# Patient Record
Sex: FEMALE | Race: WHITE | Employment: OTHER | URBAN - METROPOLITAN AREA
[De-identification: names, ages, dates, MRNs, and addresses within clinical notes are randomized per-mention and may not be internally consistent; named-entity substitution may affect disease eponyms.]

---

## 2017-10-18 ENCOUNTER — OFFICE VISIT (OUTPATIENT)
Dept: ORTHOPEDIC SURGERY | Age: 72
End: 2017-10-18

## 2017-10-18 VITALS
BODY MASS INDEX: 27.59 KG/M2 | HEIGHT: 62 IN | SYSTOLIC BLOOD PRESSURE: 124 MMHG | DIASTOLIC BLOOD PRESSURE: 70 MMHG | WEIGHT: 149.91 LBS | HEART RATE: 72 BPM

## 2017-10-18 DIAGNOSIS — M79.671 FOOT PAIN, RIGHT: Primary | ICD-10-CM

## 2017-10-18 DIAGNOSIS — M77.8 CAPSULITIS OF FOOT, RIGHT: ICD-10-CM

## 2017-10-18 PROCEDURE — 73630 X-RAY EXAM OF FOOT: CPT | Performed by: PODIATRIST

## 2017-10-18 PROCEDURE — L3260 AMBULATORY SURGICAL BOOT EAC: HCPCS | Performed by: PODIATRIST

## 2017-10-18 PROCEDURE — 99203 OFFICE O/P NEW LOW 30 MIN: CPT | Performed by: PODIATRIST

## 2017-10-18 NOTE — PROGRESS NOTES
HISTORY OF PRESENT ILLNESS: This is an initial visit for a 52-year-old female with a chief complaint of right forefoot pain. She's been having pain for approximately 3 months. 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  No history of  trauma is related. The pain is primarily on the bottom of the forefoot. The pain is worsened with weightbearing and relieved with rest.    FAMILY HISTORY:  Documented in chart. SOCIAL HISTORY:  Documented in chart. REVIEW OF SYSTEMS: Unremarkable for psychiatric, dermatologic, neurologic,  cardiovascular, hematologic, gastrointestinal, genitourinary, and pulmonary  problems. PHYSICAL EXAM: The area of greatest palpable tenderness is at the plantar aspect  of the right 2nd MTP. She has mild pain underneath the 3rd and 4th MTPs as well. She has semirigid hammertoe deformities 2-5 of the right foot and a moderate hallux valgus deformity. There is mild edema in the area without erythema or ecchymosis. There is mild pain with range of motion to the joint, especially at end range of dorsiflexion. There is no deviation of the digit in the transverse plane with range of motion, nor is there a static deformity. Palpable pedal pulses are present. The sensation is grossly intact. RADIOGRAPHS: Three weightbearing views of the right foot were obtained. There are no acute fractures or periosteal reactions noted. The 2nd and 3rd MTP is in a congruent position but slightly adducted. No joint distension is noted. She has a moderate increase in the 1st intermetatarsal angle. ASSESSMENT: 2nd, 3rd, and 4th MTP capsulitis and metatarsalgia, right foot      PLAN: The patient was educated on the pathology and its treatment options. A temporary arch support and a post-op shoe was applied to the right foot. Weightbearing will be as tolerated.  All activities are to be performed with this combination on the foot.    Overall activity is to be decreased. The patient is to rest, ice, and elevate to relieve pain and/or swelling. She has a ballroom dancing competition on November 10. I recommended she consider postponing that and not practice and dance at this time. The patient is to return in 3 weeks for reevaluation. Procedures    Darco Post-op Shoe Brace     Patient was prescribed a Darco Post-Op Shoe. The right foot will require stabilization / immobilization from this semi-rigid / rigid orthosis to improve their function. The orthosis will assist in protecting the affected area, provide functional support and facilitate healing. The patient was educated and fit by a healthcare professional with expert knowledge and specialization in brace application while under the direct supervision of the treating physician. Verbal and written instructions for the use of and application of this item were provided. They were instructed to contact the office immediately should the brace result in increased pain, decreased sensation, increased swelling or worsening of the condition.

## 2017-11-06 ENCOUNTER — OFFICE VISIT (OUTPATIENT)
Dept: ORTHOPEDIC SURGERY | Age: 72
End: 2017-11-06

## 2017-11-06 VITALS
HEART RATE: 76 BPM | BODY MASS INDEX: 27.59 KG/M2 | DIASTOLIC BLOOD PRESSURE: 75 MMHG | WEIGHT: 149.91 LBS | SYSTOLIC BLOOD PRESSURE: 135 MMHG | HEIGHT: 62 IN

## 2017-11-06 DIAGNOSIS — M77.8 CAPSULITIS OF FOOT, RIGHT: Primary | ICD-10-CM

## 2017-11-06 PROCEDURE — 99213 OFFICE O/P EST LOW 20 MIN: CPT | Performed by: PODIATRIST

## 2017-11-06 NOTE — PROGRESS NOTES
HISTORY OF PRESENT ILLNESS: This is a return visit for right forefoot pain. No new trauma is related. The pain is greater than 50% better. PHYSICAL EXAM: She has very mild palpable tenderness underneath the right 2nd MTP. There is minimal edema in the area without  erythema or ecchymosis. There is no deviation of the digit in the  transverse plane with range of motion, nor is there a static deformity. Palpable  pedal pulses are present. The sensation is grossly intact. ASSESSMENT: 2nd, 3rd, and 4th MTP capsulitis, metatarsalgia,and tenosynovitis with right foot  pain. PLAN: I advised her to use the postop shoe consistently for the next 7-10 days. If her symptoms have resolved and she can transition into an athletic shoe. From there she can try her dance shoes as tolerated. I'll see her back as needed.

## 2018-01-11 ENCOUNTER — OFFICE VISIT (OUTPATIENT)
Dept: ORTHOPEDIC SURGERY | Age: 73
End: 2018-01-11

## 2018-01-11 VITALS
WEIGHT: 149.91 LBS | BODY MASS INDEX: 27.59 KG/M2 | HEIGHT: 62 IN | SYSTOLIC BLOOD PRESSURE: 133 MMHG | HEART RATE: 64 BPM | DIASTOLIC BLOOD PRESSURE: 71 MMHG

## 2018-01-11 DIAGNOSIS — M77.8 CAPSULITIS OF FOOT, RIGHT: Primary | ICD-10-CM

## 2018-01-11 PROCEDURE — 20600 DRAIN/INJ JOINT/BURSA W/O US: CPT | Performed by: PODIATRIST

## 2018-01-11 PROCEDURE — 99213 OFFICE O/P EST LOW 20 MIN: CPT | Performed by: PODIATRIST

## 2018-01-11 RX ORDER — ALPRAZOLAM 0.5 MG/1
TABLET ORAL
COMMUNITY
Start: 2017-12-06 | End: 2019-10-25

## 2019-10-25 ENCOUNTER — OFFICE VISIT (OUTPATIENT)
Dept: ORTHOPEDIC SURGERY | Age: 74
End: 2019-10-25
Payer: MEDICARE

## 2019-10-25 VITALS
HEART RATE: 71 BPM | WEIGHT: 147 LBS | DIASTOLIC BLOOD PRESSURE: 89 MMHG | HEIGHT: 61 IN | BODY MASS INDEX: 27.75 KG/M2 | SYSTOLIC BLOOD PRESSURE: 132 MMHG

## 2019-10-25 DIAGNOSIS — M70.61 GREATER TROCHANTERIC BURSITIS OF BOTH HIPS: ICD-10-CM

## 2019-10-25 DIAGNOSIS — M70.62 GREATER TROCHANTERIC BURSITIS OF BOTH HIPS: ICD-10-CM

## 2019-10-25 DIAGNOSIS — M25.551 PAIN OF BOTH HIP JOINTS: Primary | ICD-10-CM

## 2019-10-25 DIAGNOSIS — M25.552 PAIN OF BOTH HIP JOINTS: Primary | ICD-10-CM

## 2019-10-25 PROCEDURE — 99213 OFFICE O/P EST LOW 20 MIN: CPT | Performed by: ORTHOPAEDIC SURGERY

## 2019-10-25 PROCEDURE — 20610 DRAIN/INJ JOINT/BURSA W/O US: CPT | Performed by: ORTHOPAEDIC SURGERY

## 2019-10-25 RX ORDER — PANTOPRAZOLE SODIUM 40 MG/1
TABLET, DELAYED RELEASE ORAL
Refills: 2 | COMMUNITY
Start: 2019-09-04

## 2019-12-03 ENCOUNTER — TELEPHONE (OUTPATIENT)
Dept: ORTHOPEDIC SURGERY | Age: 74
End: 2019-12-03

## 2019-12-10 ENCOUNTER — HOSPITAL ENCOUNTER (OUTPATIENT)
Dept: PHYSICAL THERAPY | Age: 74
Setting detail: THERAPIES SERIES
Discharge: HOME | End: 2019-12-10
Payer: MEDICARE

## 2019-12-10 PROCEDURE — 97140 MANUAL THERAPY 1/> REGIONS: CPT

## 2019-12-10 PROCEDURE — 97110 THERAPEUTIC EXERCISES: CPT

## 2019-12-10 PROCEDURE — 97161 PT EVAL LOW COMPLEX 20 MIN: CPT

## 2019-12-17 ENCOUNTER — APPOINTMENT (OUTPATIENT)
Dept: PHYSICAL THERAPY | Age: 74
End: 2019-12-17
Payer: MEDICARE

## 2019-12-19 ENCOUNTER — APPOINTMENT (OUTPATIENT)
Dept: PHYSICAL THERAPY | Age: 74
End: 2019-12-19
Payer: MEDICARE

## 2019-12-24 ENCOUNTER — APPOINTMENT (OUTPATIENT)
Dept: PHYSICAL THERAPY | Age: 74
End: 2019-12-24
Payer: MEDICARE

## 2019-12-31 ENCOUNTER — OFFICE VISIT (OUTPATIENT)
Dept: ORTHOPEDIC SURGERY | Age: 74
End: 2019-12-31
Payer: MEDICARE

## 2019-12-31 VITALS
HEIGHT: 61 IN | SYSTOLIC BLOOD PRESSURE: 138 MMHG | BODY MASS INDEX: 26.43 KG/M2 | WEIGHT: 140 LBS | DIASTOLIC BLOOD PRESSURE: 79 MMHG | HEART RATE: 70 BPM

## 2019-12-31 DIAGNOSIS — M70.62 GREATER TROCHANTERIC BURSITIS OF BOTH HIPS: Primary | ICD-10-CM

## 2019-12-31 DIAGNOSIS — M70.61 GREATER TROCHANTERIC BURSITIS OF BOTH HIPS: Primary | ICD-10-CM

## 2019-12-31 DIAGNOSIS — M25.552 PAIN OF BOTH HIP JOINTS: ICD-10-CM

## 2019-12-31 DIAGNOSIS — M25.551 PAIN OF BOTH HIP JOINTS: ICD-10-CM

## 2019-12-31 PROCEDURE — 99213 OFFICE O/P EST LOW 20 MIN: CPT | Performed by: ORTHOPAEDIC SURGERY

## 2019-12-31 PROCEDURE — 20610 DRAIN/INJ JOINT/BURSA W/O US: CPT | Performed by: ORTHOPAEDIC SURGERY

## 2020-01-17 ENCOUNTER — TELEPHONE (OUTPATIENT)
Dept: ORTHOPEDIC SURGERY | Age: 75
End: 2020-01-17

## 2020-01-21 ENCOUNTER — OFFICE VISIT (OUTPATIENT)
Dept: ORTHOPEDIC SURGERY | Age: 75
End: 2020-01-21
Payer: MEDICARE

## 2020-01-21 ENCOUNTER — TELEPHONE (OUTPATIENT)
Dept: ORTHOPEDIC SURGERY | Age: 75
End: 2020-01-21

## 2020-01-21 VITALS
WEIGHT: 140 LBS | BODY MASS INDEX: 26.43 KG/M2 | HEART RATE: 72 BPM | HEIGHT: 61 IN | SYSTOLIC BLOOD PRESSURE: 111 MMHG | DIASTOLIC BLOOD PRESSURE: 66 MMHG

## 2020-01-21 PROCEDURE — 99213 OFFICE O/P EST LOW 20 MIN: CPT | Performed by: ORTHOPAEDIC SURGERY

## 2020-01-21 NOTE — PROGRESS NOTES
sexual activity: None    Other Topics      Concerns:        None    Social History Narrative      None      Current Outpatient Medications:  pantoprazole (PROTONIX) 40 MG tablet, TAKE 1 TABLET BY MOUTH EVERY DAY, Disp: , Rfl: 2  oxybutynin (DITROPAN-XL) 5 MG CR tablet, , Disp: , Rfl:   ALPRAZolam (XANAX) 0.25 MG tablet, Take 0.25 mg by mouth 5 times daily. , Disp: , Rfl:   FLUoxetine (PROZAC) 20 MG capsule, Take 20 mg by mouth 2 times daily. , Disp: , Rfl:   Probiotic Product (PROBIOTIC DAILY PO), Take 1 tablet by mouth daily. , Disp: , Rfl:   FIBER PO, Take 1 tablet by mouth 5 times daily. Pt has diverticulitis, Disp: , Rfl:   Multiple Vitamins-Minerals (MULTIVITAMIN PO), Take 1 tablet by mouth daily. , Disp: , Rfl:   gabapentin (NEURONTIN) 100 MG capsule, Take 900 mg by mouth daily. 100 mg 4xs daily and 500mg at night, Disp: , Rfl:   meloxicam (MOBIC) 15 MG tablet, Take 15 mg by mouth daily. , Disp: , Rfl:   Glucosamine-Chondroitin (GLUCOSAMINE CHONDR COMPLEX PO), Take 2 tablets by mouth daily. , Disp: , Rfl:   HYDROcodone-acetaminophen (NORCO) 5-325 MG per tablet, Take 1-2 tablets by mouth every 4 hours as needed for Pain. (Patient not taking: Reported on 1/21/2020), Disp: 40 tablet, Rfl: 0    No current facility-administered medications for this visit. -- Meperidine    -- Oxycodone-Acetaminophen    -- Propoxyphene    -- Codeine -- Nausea And Vomiting   -- Percocet [Oxycodone-Acetaminophen] -- Rash    VITAL SIGNS:  /66   Pulse 72   Ht 5' 1\" (1.549 m)   Wt 140 lb (63.5 kg)   BMI 26.45 kg/m²   Examination left hip today she has excellent range of motion. She flexes almost knee-to-chest.  At 90 degrees she has 30 degrees internal rotation and nearly 70 degrees external rotation and this does not cause pain. She is very tender over the greater trochanter but she is also tender at the proximal tip of the trochanter so she may have some gluteus medius tendinosis. It is a flexible IT band.   She has pretty good abduction strength. Has no sciatic notch tenderness has a negative straight leg raise. Impression chronic recurrent left hip trochanteric bursitis. Possible gluteus medius tendon tear. Plan: Since recovery time for repair of gluteus medius tendon is much longer than IT band tenotomy and bursectomy would like to get an MRI so the patient would be aware what she is getting into. We will order an MRI and see her back once this is completed.

## 2020-01-24 ENCOUNTER — HOSPITAL ENCOUNTER (OUTPATIENT)
Dept: MRI IMAGING | Age: 75
Discharge: HOME | End: 2020-01-24
Payer: MEDICARE

## 2020-01-24 PROCEDURE — 73721 MRI JNT OF LWR EXTRE W/O DYE: CPT

## 2020-01-29 ENCOUNTER — OFFICE VISIT (OUTPATIENT)
Dept: ORTHOPEDIC SURGERY | Age: 75
End: 2020-01-29
Payer: MEDICARE

## 2020-01-29 VITALS
SYSTOLIC BLOOD PRESSURE: 139 MMHG | DIASTOLIC BLOOD PRESSURE: 82 MMHG | WEIGHT: 140 LBS | HEIGHT: 61 IN | HEART RATE: 67 BPM | BODY MASS INDEX: 26.43 KG/M2

## 2020-01-29 PROCEDURE — 99213 OFFICE O/P EST LOW 20 MIN: CPT | Performed by: ORTHOPAEDIC SURGERY

## 2020-01-29 PROCEDURE — 20610 DRAIN/INJ JOINT/BURSA W/O US: CPT | Performed by: ORTHOPAEDIC SURGERY

## 2020-01-29 NOTE — PROGRESS NOTES
Depo Medrol     NDC #: [id number]  Lot #:  [id number]  Exp Date: 05/2021    Ropivacaine    [facility]#:  [id number]  LOT#:  [id number]  Exp Date:  05/2023    Lt Hip/GTB

## 2020-02-11 ENCOUNTER — HOSPITAL ENCOUNTER (OUTPATIENT)
Dept: PHYSICAL THERAPY | Age: 75
Setting detail: THERAPIES SERIES
Discharge: HOME | End: 2020-02-11
Payer: MEDICARE

## 2020-02-11 PROCEDURE — 97110 THERAPEUTIC EXERCISES: CPT

## 2020-02-11 NOTE — PLAN OF CARE
[place] [address] [address]  Phone: [phone] Fax: [phone]        Physical Therapy Re-Certification Plan of Care/MD UPDATE      Dear  Dr. Marcos Glaser,    We had the pleasure of treating the following patient for physical therapy services at [address] [place] [address]. A summary of our findings can be found in the updated assessment below. This includes our plan of care. If you have any questions or concerns regarding these findings, please do not hesitate to contact me at the office phone number checked above.   Thank you for the referral.     Physician Signature:________________________________Date:__________________  By signing above (or electronic signature), therapists plan is approved by physician    Date Range Of Visits: 12/10/2019-2020  Total Visits to Date: 2  Overall Response to Treatment:   [x]Patient is responding well to treatment and improvement is noted with regards  to goals   []Patient should continue to improve in reasonable time if they continue HEP   []Patient has plateaued and is no longer responding to skilled PT intervention    []Patient is getting worse and would benefit from return to referring MD   []Patient unable to adhere to initial POC   []Other:       Physical Therapy Treatment Note/ Progress Report:     Date:  2020    Patient Name:  Bonnie Billjung    :  1945  MRN: [id number]  Restrictions/Precautions:    Medical/Treatment Diagnosis Information:  · Diagnosis: M70.61, M70.62 Greater trochanteric bursitis of both hips  · Treatment Diagnosis: bilateral hip pain; decreased hip ROM; decreased proximal hip strength  Insurance/Certification information:  PT Insurance Information: [employer]; [place]; $35 copay; [id number] not billable; auth after 20  Physician Information:  Referring Practitioner: Dr. Art Watts of care signed (Y/N):     Date of Patient follow up with coordination, kinesthetic sense, posture, motor skill, proprioception of core, proximal hip and LE for self care, mobility, lifting, and ambulation/stair navigation      Manual Treatments:  PROM / STM / Oscillations-Mobs:  G-I, II, III, IV (PA's, Inf., Post.)  [x] (65833) Provided manual therapy to mobilize LE, proximal hip and/or LS spine soft tissue/joints for the purpose of modulating pain, promoting relaxation,  increasing ROM, reducing/eliminating soft tissue swelling/inflammation/restriction, improving soft tissue extensibility and allowing for proper ROM for normal function with self care, mobility, lifting and ambulation. Modalities:     [] GAME READY (VASO)- for significant edema, swelling, pain control. Charges:  Timed Code Treatment Minutes: 40   Total Treatment Minutes: 40      [] EVAL (LOW) 22918 (typically 20 minutes face-to-face)  [] EVAL (MOD) 71521 (typically 30 minutes face-to-face)  [] EVAL (HIGH) 18983 (typically 45 minutes face-to-face)  [] RE-EVAL     [x] SM(03379) x  3   [] IONTO (40854)  [] NMR (01148) x     [] VASO (84019)  [] Manual (01286) x     [] Other:  [] TA (68780)x     [] Mech Traction (94571)  [] ES(attended) (27909)     [] ES (un) (95231):         GOALS:  Patient stated goal: \"drive without pain, improve balance\"  [] Progressing: [] Met: [x] Not Met: [] Adjusted    Therapist goals for Patient:   Short Term Goals: To be achieved in: 2 weeks  1. Independent in HEP and progression per patient tolerance, in order to prevent re-injury. [] Progressing: [] Met: [x] Not Met: [] Adjusted  2. Patient will have a decrease in pain to facilitate improvement in movement, function, and ADLs as indicated by Functional Deficits. [] Progressing: [] Met: [x] Not Met: [] Adjusted    Long Term Goals: To be achieved in: 8 weeks  1. Disability index score of 25% or less for the LEFS to assist with reaching prior level of function. [] Progressing: [] Met: [x] Not Met: [] Adjusted  2.  Patient will demonstrate increased [facility] [place] w/o increased sx to allow for proper joint functioning as indicated by patients Functional Deficits. [] Progressing: [] Met: [x] Not Met: [] Adjusted  3. Patient will demonstrate an increase in Strength to good proximal hip strength and control, within 5lb HHD in LE to allow for proper functional mobility as indicated by patients Functional Deficits. [] Progressing: [] Met: [x] Not Met: [] Adjusted  4. Patient will return to going up stairs functional activities without increased symptoms or restriction. [] Progressing: [] Met: [x] Not Met: [] Adjusted  5. Pt will be able to drive to [place] without increased symptoms or restriction. [x] Progressing: [] Met: [] Not Met: [] Adjusted    ASSESSMENT:  Pt tolerated treatment well. TTP over L glute med/min but otherwise no increase in sx w/ MMTs or exercises. Required cues to prevent hip flexor compensation w/ side lying clams and hip abduction. Pt to continue to benefit from skilled PT to improve functional and proximal hip strength and stability. Treatment/Activity Tolerance:  [x] Patient tolerated treatment well [] Patient limited by fatique  [] Patient limited by pain  [] Patient limited by other medical complications  [] Other:     Overall Progression Towards Functional goals/ Treatment Progress Update:  [] Patient is progressing as expected towards functional goals listed. [] Progression is slowed due to complexities/Impairments listed. [] Progression has been slowed due to co-morbidities.   [x] Plan just implemented, too soon to assess goals progression <30days   [] Goals require adjustment due to lack of progress  [] Patient is not progressing as expected and requires additional follow up with physician  [] Other    Prognosis for POC: [x] Good [] Fair  [] Poor    Patient requires continued skilled intervention: [x] Yes  [] No        PLAN: See eval  [] Continue per plan of care [] Alter current plan

## 2020-02-20 ENCOUNTER — HOSPITAL ENCOUNTER (OUTPATIENT)
Dept: PHYSICAL THERAPY | Age: 75
Setting detail: THERAPIES SERIES
Discharge: HOME | End: 2020-02-20
Payer: MEDICARE

## 2020-02-20 PROCEDURE — 97110 THERAPEUTIC EXERCISES: CPT

## 2020-02-20 NOTE — FLOWSHEET NOTE
[facility] [address] [facility]  Phone: [phone] Fax: [phone]      Physical Therapy Treatment Note/ Progress Report:     Date:  2020    Patient Name:  Clover Vargas    :  [birth year]  MRN: [id number]  Restrictions/Precautions:    Medical/Treatment Diagnosis Information:  · Diagnosis: M70.61, M70.62 Greater trochanteric bursitis of both hips  · Treatment Diagnosis: bilateral hip pain; decreased hip ROM; decreased proximal hip strength  Insurance/Certification information:  PT Insurance Information: [employer]; [place]; $35 copay; [id number] not billable; auth after 20  Physician Information:  Referring Practitioner: Dr. Starr Vee of care signed (Y/N):     Date of Patient follow up with Physician:      Progress Report: []  Yes  [x]  No     Date Range for reporting period:  Beginnin/10/2019  Ending:      Progress report due (10 Rx/or 30 days whichever is less):     Recertification due (POC duration/ or 90 days whichever is less): 3/24/2020     Visit # Insurance Allowable Auth Needed   2 [place] [x]Yes   []No     Latex Allergy:  [x]NO      []YES  Preferred Language for Healthcare:   [x]English       []other:  Functional Scale: LEFS: 38/80  Date assessed:12/10/2019    Pain level:  0/10 currenty    SUBJECTIVE:  Pt states she is feeling good today, no issues after last visit.     OBJECTIVE:   Observation:    Test measurements:     2020  Strength  LEFT RIGHT   HIP Flexors 4+ 4+   HIP Abductors 3+ 4   Knee EXT (quad) 4+ 4+   Knee Flex (HS) 4+ 4+   Ankle DF 5 5          RESTRICTIONS/PRECAUTIONS:     Exercises/Interventions:     Therapeutic Ex (90670)   Min: 40 Resistance Sets/sec Reps Notes/CUES   Retro Stepper/BIKE  5'     Alter G       Supine knees to chest  2 10    Sportco       3 way SLR 1# 2 10 Slr/abd; bilateral   Supine knee fallouts  2 10 Bilateral; red band   Clam ABD  2 10 bilateral   Hip Ext Gutierrez Other       BOSU fwd/side lunge       BOSU squat       Leg Press Iso/Con/Ecc 0- 50# 2 10    Cybex HS curl       TKE    NPV   Glute side walks red 2 10    RDL       Slide Lunge       Slide HS eccentrics       Step ups/ecc step down       Swissball wall rolls- in SLS- hip drive       Quad hip ext/wall-ball rolls       Supine piriformis stretch  30'' 3 bilateral   Glute bridge Red band 2 10    Manual Intervention (90631)  Min:15       Hip mobs/PROM       Tib/Fem Mobs       Patella Mobs       Ankle mobs              NMR re-education (38827)  Min:    CUES NEEDED   [place]une[place]/Biofeedback 10/10       BFR       G. Med activation       Hip Ext full ROM/ G. Activation       Bosu Bal and Prop- G Med       Single leg stance/Balance/Prop       Bosu Retro G. Med act       Prone Hip froggers- sliders/elevated              Therapeutic Activity (41407)  Min:       Ladders       Plyos       Dynamic Balance                                Therapeutic Exercise and NMR EXR  [x] (25618) Provided verbal/tactile cueing for activities related to strengthening, flexibility, endurance, ROM for improvements in LE, proximal hip, and core control with self care, mobility, lifting, ambulation. [x] (06012) Provided verbal/tactile cueing for activities related to improving balance, coordination, kinesthetic sense, posture, motor skill, proprioception  to assist with LE, proximal hip, and core control in self care, mobility, lifting, ambulation and eccentric single leg control.      NMR and Therapeutic Activities:    [x] (83165 or 12535) Provided verbal/tactile cueing for activities related to improving balance, coordination, kinesthetic sense, posture, motor skill, proprioception and motor activation to allow for proper function of core, proximal hip and LE with self care and ADLs and functional mobility.   [] (53771) Gait Re-education- Provided training and instruction to the patient for proper LE, core and proximal hip recruitment and positioning Adjusted  2. Patient will have a decrease in pain to facilitate improvement in movement, function, and ADLs as indicated by Functional Deficits. [] Progressing: [] Met: [x] Not Met: [] Adjusted    Long Term Goals: To be achieved in: 8 weeks  1. Disability index score of 25% or less for the LEFS to assist with reaching prior level of function. [] Progressing: [] Met: [x] Not Met: [] Adjusted  2. Patient will demonstrate increased AROM to IR [place] w/o increased sx to allow for proper joint functioning as indicated by patients Functional Deficits. [] Progressing: [] Met: [x] Not Met: [] Adjusted  3. Patient will demonstrate an increase in Strength to good proximal hip strength and control, within 5lb HHD in LE to allow for proper functional mobility as indicated by patients Functional Deficits. [] Progressing: [] Met: [x] Not Met: [] Adjusted  4. Patient will return to going up stairs functional activities without increased symptoms or restriction. [] Progressing: [] Met: [x] Not Met: [] Adjusted  5. Pt will be able to drive to [place] without increased symptoms or restriction. [x] Progressing: [] Met: [] Not Met: [] Adjusted    ASSESSMENT:  Pt tolerated treatment well. Required cues to prevent hip flexor compensation w/ side lying clams and hip abduction. Pt stated she feels good and would like to try to progress resistance and load next visit. Pt to continue to benefit from skilled PT to improve functional and proximal hip strength and stability. Treatment/Activity Tolerance:  [x] Patient tolerated treatment well [] Patient limited by fatique  [] Patient limited by pain  [] Patient limited by other medical complications  [] Other:     Overall Progression Towards Functional goals/ Treatment Progress Update:  [] Patient is progressing as expected towards functional goals listed. [] Progression is slowed due to complexities/Impairments listed.   [] Progression has been slowed due to

## 2020-02-27 ENCOUNTER — APPOINTMENT (OUTPATIENT)
Dept: PHYSICAL THERAPY | Age: 75
End: 2020-02-27
Payer: MEDICARE

## 2020-03-05 ENCOUNTER — HOSPITAL ENCOUNTER (OUTPATIENT)
Dept: PHYSICAL THERAPY | Age: 75
Setting detail: THERAPIES SERIES
Discharge: HOME | End: 2020-03-05
Payer: MEDICARE

## 2020-03-05 PROCEDURE — 97140 MANUAL THERAPY 1/> REGIONS: CPT

## 2020-03-05 PROCEDURE — 97110 THERAPEUTIC EXERCISES: CPT

## 2020-03-05 NOTE — FLOWSHEET NOTE
[place] [address] [place] [address]  Phone: [phone] Fax: [phone]      Physical Therapy Treatment Note/ Progress Report:     Date:  3/5/2020    Patient Name:  Cedric Blunt    :  [age]  MRN: [id number]  Restrictions/Precautions:    Medical/Treatment Diagnosis Information:  · Diagnosis: M70.61, M70.62 Greater trochanteric bursitis of both hips  · Treatment Diagnosis: bilateral hip pain; decreased hip ROM; decreased proximal hip strength  Insurance/Certification information:  PT Insurance Information: [employer]; [place]; [place]35 [place]; 45793 not billable; auth after 20  Physician Information:  Referring Practitioner: Dr. Sol Line of care signed (Y/N):     Date of Patient follow up with Physician:      Progress Report: []  Yes  [x]  No     Date Range for reporting period:  Beginnin/10/2019  Ending:      Progress report due (10 Rx/or 30 days whichever is less):     Recertification due (POC duration/ or 90 days whichever is less): 3/24/2020     Visit # Insurance Allowable Auth Needed   3 [place] [x]Yes   []No     Latex Allergy:  [x]NO      []YES  Preferred Language for Healthcare:   [x]English       []other:  Functional Scale: LEFS: 38/80  Date assessed:12/10/2019    Pain level:  0/10 currenty    SUBJECTIVE:  Pt states she is feeling good today. Does admit to increased hip pain last week after going to a family members dance competition and sitting on the floor so she could see buts states it is mostly better.     OBJECTIVE:   Observation:    Test measurements:     2020  Strength  LEFT RIGHT   HIP Flexors 4+ 4+   HIP Abductors 3+ 4   Knee EXT (quad) 4+ 4+   Knee Flex (HS) 4+ 4+   Ankle DF 5 5          RESTRICTIONS/PRECAUTIONS:     Exercises/Interventions:     Therapeutic Ex (33406)   Min: 40 Resistance Sets/sec Reps Notes/CUES   Retro Stepper/BIKE  5'     Alter G       Supine knees to chest  2 10 proper function of core, proximal hip and LE with self care and ADLs and functional mobility.   [] (55793) Gait Re-education- Provided training and instruction to the patient for proper LE, core and proximal hip recruitment and positioning and eccentric body weight control with ambulation re-education including up and down stairs     Home Exercise Program:    [x] (87892) Reviewed/Progressed HEP activities related to strengthening, flexibility, endurance, ROM of core, proximal hip and LE for functional self-care, mobility, lifting and ambulation/stair navigation   [] (49029)Reviewed/Progressed HEP activities related to improving balance, coordination, kinesthetic sense, posture, motor skill, proprioception of core, proximal hip and LE for self care, mobility, lifting, and ambulation/stair navigation      Manual Treatments:  PROM / STM / Oscillations-Mobs:  G-I, II, III, IV (PA's, Inf., Post.)  [x] (89487) Provided manual therapy to mobilize LE, proximal hip and/or LS spine soft tissue/joints for the purpose of modulating pain, promoting relaxation,  increasing ROM, reducing/eliminating soft tissue swelling/inflammation/restriction, improving soft tissue extensibility and allowing for proper ROM for normal function with self care, mobility, lifting and ambulation. Modalities:     [] GAME READY (VASO)- for significant edema, swelling, pain control.      Charges:  Timed Code Treatment Minutes: 40   Total Treatment Minutes: 40      [] EVAL (LOW) 36288 (typically 20 minutes face-to-face)  [] EVAL (MOD) 22611 (typically 30 minutes face-to-face)  [] EVAL (HIGH) 78759 (typically 45 minutes face-to-face)  [] RE-EVAL     [x] IB(35150) x  2   [] IONTO (01132)  [] NMR (85327) x     [] VASO (90577)  [x] Manual (08550) x  1   [] Other:  [] TA (25941)x     [] Mech Traction (62291)  [] ES(attended) (74014)     [] ES (un) (59217):         GOALS:  Patient stated goal: \"drive without pain, improve balance\"  [] Progressing: [] Met: [x] Not Met: [] Adjusted    Therapist goals for Patient:   Short Term Goals: To be achieved in: 2 weeks  1. Independent in HEP and progression per patient tolerance, in order to prevent re-injury. [] Progressing: [] Met: [x] Not Met: [] Adjusted  2. Patient will have a decrease in pain to facilitate improvement in movement, function, and ADLs as indicated by Functional Deficits. [] Progressing: [] Met: [x] Not Met: [] Adjusted    Long Term Goals: To be achieved in: 8 weeks  1. Disability index score of 25% or less for the LEFS to assist with reaching prior level of function. [] Progressing: [] Met: [x] Not Met: [] Adjusted  2. Patient will demonstrate increased AROM to IR [place] w/o increased sx to allow for proper joint functioning as indicated by patients Functional Deficits. [] Progressing: [] Met: [x] Not Met: [] Adjusted  3. Patient will demonstrate an increase in Strength to good proximal hip strength and control, within 5lb HHD in LE to allow for proper functional mobility as indicated by patients Functional Deficits. [] Progressing: [] Met: [x] Not Met: [] Adjusted  4. Patient will return to going up stairs functional activities without increased symptoms or restriction. [] Progressing: [] Met: [x] Not Met: [] Adjusted  5. Pt will be able to drive to [place] without increased symptoms or restriction. [x] Progressing: [] Met: [] Not Met: [] Adjusted    ASSESSMENT:  Pt tolerated treatment well. Tolerated progression of resistance well with increased but appropriate muscle fatigue. No increased sx reported. Pt to continue to benefit from skilled PT to improve functional and proximal hip strength and stability.          Treatment/Activity Tolerance:  [x] Patient tolerated treatment well [] Patient limited by fatique  [] Patient limited by pain  [] Patient limited by other medical complications  [] Other:     Overall Progression Towards Functional goals/ Treatment Progress Update:  [x] Patient is progressing as expected towards functional goals listed. [] Progression is slowed due to complexities/Impairments listed. [] Progression has been slowed due to co-morbidities. [] Plan just implemented, too soon to assess goals progression <30days   [] Goals require adjustment due to lack of progress  [] Patient is not progressing as expected and requires additional follow up with physician  [] Other    Prognosis for POC: [x] Good [] Fair  [] Poor    Patient requires continued skilled intervention: [x] Yes  [] No        PLAN: Progress proximal hip and LE strength as tolerated  [x] Continue per plan of care [] Alter current plan (see comments)  [] Plan of care initiated [] Hold pending MD visit [] Discharge    Electronically signed by: David Randolph, PT    Note: If patient does not return for scheduled/recommended follow up visits, this note will serve as a discharge from care along with the most recent update on progress.

## 2020-03-11 ENCOUNTER — OFFICE VISIT (OUTPATIENT)
Dept: ORTHOPEDIC SURGERY | Age: 75
End: 2020-03-11
Payer: MEDICARE

## 2020-03-11 VITALS
HEIGHT: 61 IN | DIASTOLIC BLOOD PRESSURE: 82 MMHG | WEIGHT: 140 LBS | HEART RATE: 69 BPM | SYSTOLIC BLOOD PRESSURE: 149 MMHG | BODY MASS INDEX: 26.43 KG/M2

## 2020-03-11 PROCEDURE — 20610 DRAIN/INJ JOINT/BURSA W/O US: CPT | Performed by: ORTHOPAEDIC SURGERY

## 2020-03-11 PROCEDURE — 99213 OFFICE O/P EST LOW 20 MIN: CPT | Performed by: ORTHOPAEDIC SURGERY

## 2020-03-11 RX ORDER — ROPIVACAINE HYDROCHLORIDE 5 MG/ML
30 INJECTION, SOLUTION EPIDURAL; INFILTRATION; PERINEURAL ONCE
Status: COMPLETED | OUTPATIENT
Start: 2020-03-11 | End: 2020-03-11

## 2020-03-11 RX ORDER — METHYLPREDNISOLONE ACETATE 40 MG/ML
40 INJECTION, SUSPENSION INTRA-ARTICULAR; INTRALESIONAL; INTRAMUSCULAR; SOFT TISSUE ONCE
Status: COMPLETED | OUTPATIENT
Start: 2020-03-11 | End: 2020-03-11

## 2020-03-11 RX ADMIN — ROPIVACAINE HYDROCHLORIDE 30 ML: 5 INJECTION, SOLUTION EPIDURAL; INFILTRATION; PERINEURAL at 10:22

## 2020-03-11 RX ADMIN — METHYLPREDNISOLONE ACETATE 40 MG: 40 INJECTION, SUSPENSION INTRA-ARTICULAR; INTRALESIONAL; INTRAMUSCULAR; SOFT TISSUE at 10:21

## 2020-03-11 NOTE — PROGRESS NOTES
Patient returns today for her left hip. She has some fraying of the gluteus medius tendon but mainly has greater trochanteric bursitis. 6 weeks ago we injected her and she is been in physical therapy and she is better. She says she is getting a little bit of symptoms again. But the symptoms are not severe enough where they stop her from doing anything. ROS: Pertinent items are noted in HPI. No notes on file    Past Medical History:  No date: H/O breast biopsy     Past Surgical History:  No date:  SECTION  No date: KNEE SURGERY; Left      Comment:  ACL repair  No date: KNEE SURGERY; Left      Comment:  TKR    History reviewed. No pertinent family history.       Social History    Socioeconomic History      Marital status:       Spouse name: None      Number of children: None      Years of education: None      Highest education level: None    Occupational History      None    Social Needs      Financial resource strain: None      Food insecurity        Worry: None        Inability: None      Transportation needs        Medical: None        Non-medical: None    Tobacco Use      Smoking status: Never Smoker      Smokeless tobacco: Never Used    Substance and Sexual Activity      Alcohol use: Yes        Comment: social       Drug use: No      Sexual activity: Yes        Partners: Male    Lifestyle      Physical activity        Days per week: None        Minutes per session: None      Stress: None    Relationships      Social connections        Talks on phone: None        Gets together: None        Attends Buddhism service: None        Active member of club or organization: None        Attends meetings of clubs or organizations: None        Relationship status: None      Intimate partner violence        Fear of current or ex partner: None        Emotionally abused: None        Physically abused: None        Forced sexual activity: None    Other Topics      Concerns:        None    Social History

## 2020-05-20 ENCOUNTER — OFFICE VISIT (OUTPATIENT)
Dept: ORTHOPEDIC SURGERY | Age: 75
End: 2020-05-20
Payer: MEDICARE

## 2020-05-20 VITALS — WEIGHT: 140 LBS | BODY MASS INDEX: 26.43 KG/M2 | TEMPERATURE: 97.7 F | HEIGHT: 61 IN

## 2020-05-20 PROCEDURE — 99213 OFFICE O/P EST LOW 20 MIN: CPT | Performed by: ORTHOPAEDIC SURGERY

## 2020-05-20 NOTE — PROGRESS NOTES
[facility]  Office Visit  Follow up for Left hip pain  Date:  2020    Name:  Roland Garcia  Address:  [address] [place] [address]    :  1945      Age:   69 y.o.    SSN:  [id number]      Medical Record Number:  [id number]    Chief Complaint:    Follow up for Left hip pain    HPI:   Roland Garcia is a 69 y.o. female who is following up on left hip. To recap, She is being treated for trochanteric bursitis. She has received several injections with diminishing returns. The last few injections have not helped at al. She had an MRI 20 that showed tendinosis but no significant tear. She continues to have daily pain with ambulation dispite conservative management. It causes significant limp and interferes with her daily activities. She denies any new numbness, tingling, fevers, chills, chest pain, shortness of breath, or any other new significant symptoms. Past History:  Past Medical History:   Diagnosis Date    H/O breast biopsy        Past Surgical History:   Procedure Laterality Date     SECTION      KNEE SURGERY Left     ACL repair    KNEE SURGERY Left     TKR       Social History     Tobacco Use    Smoking status: Never Smoker    Smokeless tobacco: Never Used   Substance Use Topics    Alcohol use: Yes     Comment: social     Drug use: No        Family History:  family history is not on file. Current Outpatient Medications:     pantoprazole (PROTONIX) 40 MG tablet, TAKE 1 TABLET BY MOUTH EVERY DAY, Disp: , Rfl: 2    oxybutynin (DITROPAN-XL) 5 MG CR tablet, , Disp: , Rfl:     HYDROcodone-acetaminophen (NORCO) 5-325 MG per tablet, Take 1-2 tablets by mouth every 4 hours as needed for Pain., Disp: 40 tablet, Rfl: 0    ALPRAZolam (XANAX) 0.25 MG tablet, Take 0.25 mg by mouth 5 times daily. , Disp: , Rfl:     FLUoxetine (PROZAC) 20 MG capsule, Take 20 mg by mouth 2 times daily. , Disp: , Rfl:     Probiotic Product

## 2020-06-01 ENCOUNTER — TELEPHONE (OUTPATIENT)
Dept: ORTHOPEDIC SURGERY | Age: 75
End: 2020-06-01

## 2020-06-03 ENCOUNTER — TELEPHONE (OUTPATIENT)
Dept: ORTHOPEDIC SURGERY | Age: 75
End: 2020-06-03

## 2020-06-17 ENCOUNTER — TELEPHONE (OUTPATIENT)
Dept: ORTHOPEDIC SURGERY | Age: 75
End: 2020-06-17

## 2020-06-17 NOTE — TELEPHONE ENCOUNTER
Auth: NPR  Date: 06/25/20  Reference # [id number]  Spoke with: NIKITA  Type of SX: OUTPATIENT  Location: [facility]  CPT 29894, [id number]   SX area: [facility]  Insurance: Kitty Rhoades

## 2020-06-18 ENCOUNTER — TELEPHONE (OUTPATIENT)
Dept: ORTHOPEDIC SURGERY | Age: 75
End: 2020-06-18

## 2020-06-19 ENCOUNTER — OFFICE VISIT (OUTPATIENT)
Dept: PRIMARY CARE CLINIC | Age: 75
End: 2020-06-19
Payer: MEDICARE

## 2020-06-19 ENCOUNTER — HOSPITAL ENCOUNTER (OUTPATIENT)
Age: 75
Discharge: HOME | End: 2020-06-19
Payer: MEDICARE

## 2020-06-19 LAB
A/G RATIO: 2.2 (ref 1.1–2.2)
ALBUMIN SERPL-MCNC: 4.3 G/DL (ref 3.4–5)
ALP BLD-CCNC: 73 U/L (ref 40–129)
ALT SERPL-CCNC: 13 U/L (ref 10–40)
ANION GAP SERPL CALCULATED.3IONS-SCNC: 13 MMOL/L (ref 3–16)
AST SERPL-CCNC: 19 U/L (ref 15–37)
BASOPHILS ABSOLUTE: 0 K/UL (ref 0–0.2)
BASOPHILS RELATIVE PERCENT: 0.6 %
BILIRUB SERPL-MCNC: 0.5 MG/DL (ref 0–1)
BUN BLDV-MCNC: 15 MG/DL (ref 7–20)
CALCIUM SERPL-MCNC: 9.6 MG/DL (ref 8.3–10.6)
CHLORIDE BLD-SCNC: 100 MMOL/L (ref 99–110)
CO2: 28 MMOL/L (ref 21–32)
CREAT SERPL-MCNC: 1.2 MG/DL (ref 0.6–1.2)
EKG ATRIAL RATE: 64 BPM
EKG DIAGNOSIS: NORMAL
EKG P AXIS: 68 DEGREES
EKG P-R INTERVAL: 124 MS
EKG Q-T INTERVAL: 432 MS
EKG QRS DURATION: 90 MS
EKG QTC CALCULATION (BAZETT): 445 MS
EKG R AXIS: -14 DEGREES
EKG T AXIS: 42 DEGREES
EKG VENTRICULAR RATE: 64 BPM
EOSINOPHILS ABSOLUTE: 0.2 K/UL (ref 0–0.6)
EOSINOPHILS RELATIVE PERCENT: 3.7 %
GFR AFRICAN AMERICAN: 53
GFR NON-AFRICAN AMERICAN: 44
GLOBULIN: 2 G/DL
GLUCOSE BLD-MCNC: 95 MG/DL (ref 70–99)
HCT VFR BLD CALC: 38.4 % (ref 36–48)
HEMOGLOBIN: 13 G/DL (ref 12–16)
LYMPHOCYTES ABSOLUTE: 0.6 K/UL (ref 1–5.1)
LYMPHOCYTES RELATIVE PERCENT: 10.4 %
MCH RBC QN AUTO: 32.5 PG (ref 26–34)
MCHC RBC AUTO-ENTMCNC: 34 G/DL (ref 31–36)
MCV RBC AUTO: 95.5 FL (ref 80–100)
MONOCYTES ABSOLUTE: 0.4 K/UL (ref 0–1.3)
MONOCYTES RELATIVE PERCENT: 6.1 %
NEUTROPHILS ABSOLUTE: 4.8 K/UL (ref 1.7–7.7)
NEUTROPHILS RELATIVE PERCENT: 79.2 %
PDW BLD-RTO: 13.3 % (ref 12.4–15.4)
PLATELET # BLD: 245 K/UL (ref 135–450)
PMV BLD AUTO: 10.2 FL (ref 5–10.5)
POTASSIUM SERPL-SCNC: 4.4 MMOL/L (ref 3.5–5.1)
RBC # BLD: 4.02 M/UL (ref 4–5.2)
SODIUM BLD-SCNC: 141 MMOL/L (ref 136–145)
TOTAL PROTEIN: 6.3 G/DL (ref 6.4–8.2)
WBC # BLD: 6 K/UL (ref 4–11)

## 2020-06-19 PROCEDURE — 80053 COMPREHEN METABOLIC PANEL: CPT

## 2020-06-19 PROCEDURE — 93005 ELECTROCARDIOGRAM TRACING: CPT

## 2020-06-19 PROCEDURE — 85025 COMPLETE CBC W/AUTO DIFF WBC: CPT

## 2020-06-19 PROCEDURE — 36415 COLL VENOUS BLD VENIPUNCTURE: CPT

## 2020-06-19 PROCEDURE — 99211 OFF/OP EST MAY X REQ PHY/QHP: CPT | Performed by: NURSE PRACTITIONER

## 2020-06-19 NOTE — PATIENT INSTRUCTIONS
bags, handling, and disposing of trash. Wash hands after handling or disposing of trash.  Consider consulting with your local health department about trash disposal guidance if available. Information for Household Members and Caregivers of Someone who is Sick   Call ahead before visiting your doctor   Call ahead: If you have a medical appointment, call the healthcare provider and tell them that you have or may have COVID-19. This will help the healthcare provider's office take steps to keep other people from getting infected or exposed. Wear a facemask if you are sick   ; If you are sick: You should wear a facemask when you are around other people (e.g., sharing a room or vehicle) or pets and before you enter a healthcare provider's office. ; If you are caring for others: If the person who is sick is not able to wear a facemask (for example, because it causes trouble breathing), then people who live with the person who is sick should not stay in the same room with them, or they should wear a facemask if they enter a room with the person who is sick. Cover your coughs and sneezes   ; Cover: Cover your mouth and nose with a tissue when you cough or sneeze.   ; Dispose: Throw used tissues in a lined trash can.   ; Wash hands: Immediately wash your hands with soap and water for at least 20 seconds or, if soap and water are not available, clean your hands with an alcohol-based hand  that contains at least 60% alcohol. Clean your hands often   ;  Wash hands: Wash your hands often with soap and water for at least 20 seconds, especially after blowing your nose, coughing, or sneezing; going to the bathroom; and before eating or preparing food.   ; Hand : If soap and water are not readily available, use an alcohol-based hand  with at least 60% alcohol, covering all surfaces of your hands and rubbing them together until they feel dry.   ; Soap and water: Soap and water are the best option if hands are visibly dirty.   ; Avoid touching: Avoid touching your eyes, nose, and mouth with unwashed hands. Handwashing Tips   ; Wet your hands with clean, running water (warm or cold), turn off the tap, and apply soap.  ; Lather your hands by rubbing them together with the soap. Lather the backs of your hands, between your fingers, and under your nails. ; Scrub your hands for at least 20 seconds. Need a timer? Hum the [place] from beginning to end twice.  ; Rinse your hands well under clean, running water.  ; Dry your hands using a clean towel or air dry them. Avoid sharing personal household items   ; Do not share: You should not share dishes, drinking glasses, cups, eating utensils, towels, or bedding with other people or pets in your home.   ; Wash thoroughly after use: After using these items, they should be washed thoroughly with soap and water. Clean all high-touch surfaces everyday   ; Clean and disinfect: Practice routine cleaning of high touch surfaces.  ; High touch surfaces include counters, tabletops, doorknobs, bathroom fixtures, toilets, phones, keyboards, tablets, and bedside tables.  ; Disinfect areas with bodily fluids: Also, clean any surfaces that may have blood, stool, or body fluids on them.   ; Household : Use a household cleaning spray or wipe, according to the label instructions. Labels contain instructions for safe and effective use of the cleaning product including precautions you should take when applying the product, such as wearing gloves and making sure you have good ventilation during use of the product.     Monitor your symptoms   Seek medical attention: Seek prompt medical attention if your illness is worsening     (e.g., difficulty breathing).   ; Call your doctor: Before seeking care, call your healthcare provider and tell them that you have, or are being evaluated for, COVID-19.   ; Wear a facemask when sick: Put on a facemask before you enter the your e-mail address. You will receive e-mail notification when new information is available in [address]. Click Sign Up. You can now view your medical record. Additional Information  If you have questions, please contact your physician practice where you receive care. Remember, MyChart is NOT to be used for urgent needs. For medical emergencies, dial 911.

## 2020-06-20 LAB
SARS-COV-2: NOT DETECTED
SOURCE: NORMAL

## 2020-06-22 ENCOUNTER — OFFICE VISIT (OUTPATIENT)
Dept: ORTHOPEDIC SURGERY | Age: 75
End: 2020-06-22

## 2020-06-22 VITALS — TEMPERATURE: 98 F | BODY MASS INDEX: 26.24 KG/M2 | HEIGHT: 61 IN | WEIGHT: 139 LBS

## 2020-06-22 PROCEDURE — 99024 POSTOP FOLLOW-UP VISIT: CPT | Performed by: ORTHOPAEDIC SURGERY

## 2020-06-22 RX ORDER — HYDROCODONE BITARTRATE AND ACETAMINOPHEN 5; 325 MG/1; MG/1
1 TABLET ORAL EVERY 6 HOURS PRN
Qty: 20 TABLET | Refills: 0 | Status: SHIPPED | OUTPATIENT
Start: 2020-06-22 | End: 2020-06-27

## 2020-06-22 NOTE — PROGRESS NOTES
phone: None     Gets together: None     Attends Sikhism service: None     Active member of club or organization: None     Attends meetings of clubs or organizations: None     Relationship status: None    Intimate partner violence     Fear of current or ex partner: None     Emotionally abused: None     Physically abused: None     Forced sexual activity: None   Other Topics Concern    None   Social History Narrative    None       Current Outpatient Medications   Medication Sig Dispense Refill    pantoprazole (PROTONIX) 40 MG tablet TAKE 1 TABLET BY MOUTH EVERY DAY  2    oxybutynin (DITROPAN-XL) 5 MG CR tablet daily       ALPRAZolam (XANAX) 0.25 MG tablet Take 0.25 mg by mouth 5 times daily.  FLUoxetine (PROZAC) 20 MG capsule Take 40 mg by mouth 2 times daily       Probiotic Product (PROBIOTIC DAILY PO) Take 1 tablet by mouth daily.  FIBER PO Take 5 tablets by mouth daily Pt has diverticulitis      Multiple Vitamins-Minerals (MULTIVITAMIN PO) Take 1 tablet by mouth daily.  gabapentin (NEURONTIN) 100 MG capsule Take 1,000 mg by mouth daily. 100 mg 4xs daily and 600mg at night      meloxicam (MOBIC) 15 MG tablet Take 15 mg by mouth daily. No current facility-administered medications for this visit. Allergies   Allergen Reactions    Codeine Nausea And Vomiting    Meperidine Nausea And Vomiting    Oxycodone-Acetaminophen Nausea And Vomiting    Percocet [Oxycodone-Acetaminophen] Rash    Propoxyphene Nausea And Vomiting       Vital signs:  Temp 98 °F (36.7 °C)   Ht 5' 1\" (1.549 m)   Wt 139 lb (63 kg)   BMI 26.26 kg/m²        Constitutional: The physical examination finds the patient to be well-developed and well-nourished. The patient is alert and oriented x3 and was cooperative throughout the visit. Neuro: no focal deficits noted.  Normal mood, judgement, decision making  Eyes: sclera clear, EOMI  Ears: Normal external ear  Mouth:  No perioral lesions  Pulm: Respirations unlabored and regular  Pulse: Extremities well perfused, warm, capillary refill < 2 seconds  Musculoskeletal:    Hip Examination: LEFT    Skin/Inspection: no skin lesions, cellulitis, or extreme edema in the lower extremities. Palpation: Non tender around the ASIS, AIIS, tender at the greater trochanter, abductor musculature, and TFL origin. Range of Motion: Flexion arc 0 to 115deg, internal rotation to 25 deg, external rotation to 45 deg, with no pain or difficulty. Strength: 5/5 hip flexion strength, 5/5 abductor strength, 5/5 adductor strength. Special Tests: No pain FADIR , no pain with WALTER, positive Hussein test.       Contralateral Hip Examination: Right    Skin/Inspection: no skin lesions, cellulitis, or extreme edema in the lower extremities. Palpation: Non tender around the ASIS, AIIS, greater trochanter, abductor musculature, nor TFL origin. Range of Motion: Flexion arc 0 to 100deg, internal rotation to 25 deg, external rotation to 45 deg, with no pain or difficulty. Strength: 5/5 hip flexion strength, 5/5 abductor strength, 5/5 adductor strength. Special Tests: No pain FADIR , no pain with WALTER, negative Hussein test.       Diagnostics:  Radiology:     No new images were taken    Assessment: Jin Neves is a 77 y.o.  patient who has persistent greater trochanteric bursitis and Iliotibial band syndrome. Plan:   Risks, benefits, advantages, disadvantages and potential complications as well as the anticipated postoperative course were discussed. The patient agreed to pursue this treatment option. All questions were addressed to their satisfaction.     No personal history of diabetes, or blood pressure issues  No personal or family history of bleeding  No personal or family history of DVT/PE  + personal of GI reflux, take pantoprozole    No personal or family history of problems with Anaesthesia    We will prescribe her norco post op, and she is to take a low dose aspirin for 1 month or so post op for DVT prophylaxis. Kayla Jefferson is in agreement with this plan. All questions were answered to patient's satisfaction and was encouraged to call with any further questions. Sincerely,    MD Julien Zamarripa [address]   Email: [email or web address]  Cell: [phone]    The encounter with Kayla Jefferson was supervised by Dr Aditya Smith who personally examined the patient and reviewed the plan. This dictation was performed with a verbal recognition program (DRAGON) and it was checked for errors. It is possible that there are still dictated errors within this office note. If so, please bring any errors to my attention for an addendum. All efforts were made to ensure that this office note is accurate. Attestation:  I was physically present and performed my own examination of this patient and have discussed the case, including pertinent history and exam findings with the fellow. I agree with the documented assessment and plan. Jacki Mills.  Aditya Smith MD

## 2020-06-25 ENCOUNTER — ANESTHESIA (OUTPATIENT)
Dept: OPERATING ROOM | Age: 75
End: 2020-06-25
Payer: MEDICARE

## 2020-06-25 ENCOUNTER — HOSPITAL ENCOUNTER (OUTPATIENT)
Age: 75
Setting detail: OUTPATIENT SURGERY
Discharge: HOME | End: 2020-06-25
Attending: ORTHOPAEDIC SURGERY | Admitting: ORTHOPAEDIC SURGERY
Payer: MEDICARE

## 2020-06-25 ENCOUNTER — ANESTHESIA EVENT (OUTPATIENT)
Dept: OPERATING ROOM | Age: 75
End: 2020-06-25
Payer: MEDICARE

## 2020-06-25 VITALS
RESPIRATION RATE: 6 BRPM | DIASTOLIC BLOOD PRESSURE: 62 MMHG | OXYGEN SATURATION: 100 % | SYSTOLIC BLOOD PRESSURE: 132 MMHG | TEMPERATURE: 96.1 F

## 2020-06-25 VITALS
SYSTOLIC BLOOD PRESSURE: 140 MMHG | HEIGHT: 61 IN | TEMPERATURE: 97.5 F | WEIGHT: 142 LBS | BODY MASS INDEX: 26.81 KG/M2 | RESPIRATION RATE: 16 BRPM | OXYGEN SATURATION: 98 % | DIASTOLIC BLOOD PRESSURE: 65 MMHG | HEART RATE: 64 BPM

## 2020-06-25 PROCEDURE — 3700000000 HC ANESTHESIA ATTENDED CARE: Performed by: ORTHOPAEDIC SURGERY

## 2020-06-25 PROCEDURE — 6360000002 HC RX W HCPCS: Performed by: ORTHOPAEDIC SURGERY

## 2020-06-25 PROCEDURE — 7100000010 HC PHASE II RECOVERY - FIRST 15 MIN: Performed by: ORTHOPAEDIC SURGERY

## 2020-06-25 PROCEDURE — 3700000001 HC ADD 15 MINUTES (ANESTHESIA): Performed by: ORTHOPAEDIC SURGERY

## 2020-06-25 PROCEDURE — 7100000000 HC PACU RECOVERY - FIRST 15 MIN: Performed by: ORTHOPAEDIC SURGERY

## 2020-06-25 PROCEDURE — 3600000012 HC SURGERY LEVEL 2 ADDTL 15MIN: Performed by: ORTHOPAEDIC SURGERY

## 2020-06-25 PROCEDURE — 2500000003 HC RX 250 WO HCPCS: Performed by: NURSE ANESTHETIST, CERTIFIED REGISTERED

## 2020-06-25 PROCEDURE — 7100000011 HC PHASE II RECOVERY - ADDTL 15 MIN: Performed by: ORTHOPAEDIC SURGERY

## 2020-06-25 PROCEDURE — 3600000002 HC SURGERY LEVEL 2 BASE: Performed by: ORTHOPAEDIC SURGERY

## 2020-06-25 PROCEDURE — 2580000003 HC RX 258: Performed by: ORTHOPAEDIC SURGERY

## 2020-06-25 PROCEDURE — 2709999900 HC NON-CHARGEABLE SUPPLY: Performed by: ORTHOPAEDIC SURGERY

## 2020-06-25 PROCEDURE — 6360000002 HC RX W HCPCS: Performed by: NURSE ANESTHETIST, CERTIFIED REGISTERED

## 2020-06-25 PROCEDURE — 6370000000 HC RX 637 (ALT 250 FOR IP): Performed by: NURSE ANESTHETIST, CERTIFIED REGISTERED

## 2020-06-25 PROCEDURE — 7100000001 HC PACU RECOVERY - ADDTL 15 MIN: Performed by: ORTHOPAEDIC SURGERY

## 2020-06-25 RX ORDER — LIDOCAINE HYDROCHLORIDE 20 MG/ML
INJECTION, SOLUTION EPIDURAL; INFILTRATION; INTRACAUDAL; PERINEURAL PRN
Status: DISCONTINUED | OUTPATIENT
Start: 2020-06-25 | End: 2020-06-25 | Stop reason: SDUPTHER

## 2020-06-25 RX ORDER — LABETALOL HYDROCHLORIDE 5 MG/ML
5 INJECTION, SOLUTION INTRAVENOUS EVERY 10 MIN PRN
Status: DISCONTINUED | OUTPATIENT
Start: 2020-06-25 | End: 2020-06-25 | Stop reason: HOSPADM

## 2020-06-25 RX ORDER — PROPOFOL 10 MG/ML
INJECTION, EMULSION INTRAVENOUS PRN
Status: DISCONTINUED | OUTPATIENT
Start: 2020-06-25 | End: 2020-06-25 | Stop reason: SDUPTHER

## 2020-06-25 RX ORDER — EPHEDRINE SULFATE/0.9% NACL/PF 50 MG/5 ML
SYRINGE (ML) INTRAVENOUS PRN
Status: DISCONTINUED | OUTPATIENT
Start: 2020-06-25 | End: 2020-06-25 | Stop reason: SDUPTHER

## 2020-06-25 RX ORDER — FENTANYL CITRATE 50 UG/ML
25 INJECTION, SOLUTION INTRAMUSCULAR; INTRAVENOUS EVERY 5 MIN PRN
Status: DISCONTINUED | OUTPATIENT
Start: 2020-06-25 | End: 2020-06-25 | Stop reason: HOSPADM

## 2020-06-25 RX ORDER — FENTANYL CITRATE 50 UG/ML
50 INJECTION, SOLUTION INTRAMUSCULAR; INTRAVENOUS EVERY 5 MIN PRN
Status: DISCONTINUED | OUTPATIENT
Start: 2020-06-25 | End: 2020-06-25 | Stop reason: HOSPADM

## 2020-06-25 RX ORDER — FENTANYL CITRATE 50 UG/ML
INJECTION, SOLUTION INTRAMUSCULAR; INTRAVENOUS PRN
Status: DISCONTINUED | OUTPATIENT
Start: 2020-06-25 | End: 2020-06-25 | Stop reason: SDUPTHER

## 2020-06-25 RX ORDER — MEPERIDINE HYDROCHLORIDE 25 MG/ML
12.5 INJECTION INTRAMUSCULAR; INTRAVENOUS; SUBCUTANEOUS EVERY 5 MIN PRN
Status: DISCONTINUED | OUTPATIENT
Start: 2020-06-25 | End: 2020-06-25 | Stop reason: HOSPADM

## 2020-06-25 RX ORDER — ONDANSETRON 2 MG/ML
4 INJECTION INTRAMUSCULAR; INTRAVENOUS
Status: DISCONTINUED | OUTPATIENT
Start: 2020-06-25 | End: 2020-06-25 | Stop reason: HOSPADM

## 2020-06-25 RX ORDER — ROPIVACAINE HYDROCHLORIDE 5 MG/ML
INJECTION, SOLUTION EPIDURAL; INFILTRATION; PERINEURAL
Status: COMPLETED | OUTPATIENT
Start: 2020-06-25 | End: 2020-06-25

## 2020-06-25 RX ORDER — GLYCOPYRROLATE 1 MG/5 ML
SYRINGE (ML) INTRAVENOUS PRN
Status: DISCONTINUED | OUTPATIENT
Start: 2020-06-25 | End: 2020-06-25 | Stop reason: SDUPTHER

## 2020-06-25 RX ORDER — ROCURONIUM BROMIDE 10 MG/ML
INJECTION, SOLUTION INTRAVENOUS PRN
Status: DISCONTINUED | OUTPATIENT
Start: 2020-06-25 | End: 2020-06-25 | Stop reason: SDUPTHER

## 2020-06-25 RX ORDER — SUCCINYLCHOLINE/SOD CL,ISO/PF 200MG/10ML
SYRINGE (ML) INTRAVENOUS PRN
Status: DISCONTINUED | OUTPATIENT
Start: 2020-06-25 | End: 2020-06-25 | Stop reason: SDUPTHER

## 2020-06-25 RX ORDER — NEOSTIGMINE METHYLSULFATE 5 MG/5 ML
SYRINGE (ML) INTRAVENOUS PRN
Status: DISCONTINUED | OUTPATIENT
Start: 2020-06-25 | End: 2020-06-25 | Stop reason: SDUPTHER

## 2020-06-25 RX ORDER — SODIUM CHLORIDE, SODIUM LACTATE, POTASSIUM CHLORIDE, CALCIUM CHLORIDE 600; 310; 30; 20 MG/100ML; MG/100ML; MG/100ML; MG/100ML
INJECTION, SOLUTION INTRAVENOUS CONTINUOUS
Status: DISCONTINUED | OUTPATIENT
Start: 2020-06-25 | End: 2020-06-25 | Stop reason: HOSPADM

## 2020-06-25 RX ORDER — HYDRALAZINE HYDROCHLORIDE 20 MG/ML
5 INJECTION INTRAMUSCULAR; INTRAVENOUS EVERY 10 MIN PRN
Status: DISCONTINUED | OUTPATIENT
Start: 2020-06-25 | End: 2020-06-25 | Stop reason: HOSPADM

## 2020-06-25 RX ORDER — PHENYLEPHRINE HCL IN 0.9% NACL 1 MG/10 ML
SYRINGE (ML) INTRAVENOUS PRN
Status: DISCONTINUED | OUTPATIENT
Start: 2020-06-25 | End: 2020-06-25 | Stop reason: SDUPTHER

## 2020-06-25 RX ORDER — LIDOCAINE HYDROCHLORIDE 40 MG/ML
SOLUTION TOPICAL PRN
Status: DISCONTINUED | OUTPATIENT
Start: 2020-06-25 | End: 2020-06-25 | Stop reason: SDUPTHER

## 2020-06-25 RX ORDER — ONDANSETRON 2 MG/ML
INJECTION INTRAMUSCULAR; INTRAVENOUS PRN
Status: DISCONTINUED | OUTPATIENT
Start: 2020-06-25 | End: 2020-06-25 | Stop reason: SDUPTHER

## 2020-06-25 RX ORDER — DEXAMETHASONE SODIUM PHOSPHATE 4 MG/ML
INJECTION, SOLUTION INTRA-ARTICULAR; INTRALESIONAL; INTRAMUSCULAR; INTRAVENOUS; SOFT TISSUE PRN
Status: DISCONTINUED | OUTPATIENT
Start: 2020-06-25 | End: 2020-06-25 | Stop reason: SDUPTHER

## 2020-06-25 RX ORDER — LIDOCAINE HYDROCHLORIDE 10 MG/ML
0.5 INJECTION, SOLUTION EPIDURAL; INFILTRATION; INTRACAUDAL; PERINEURAL ONCE
Status: DISCONTINUED | OUTPATIENT
Start: 2020-06-25 | End: 2020-06-25 | Stop reason: HOSPADM

## 2020-06-25 RX ADMIN — Medication 2 MG: at 07:57

## 2020-06-25 RX ADMIN — Medication 160 MG: at 07:29

## 2020-06-25 RX ADMIN — LIDOCAINE HYDROCHLORIDE 100 MG: 20 INJECTION, SOLUTION EPIDURAL; INFILTRATION; INTRACAUDAL; PERINEURAL at 07:27

## 2020-06-25 RX ADMIN — Medication 0.2 MG: at 07:57

## 2020-06-25 RX ADMIN — ONDANSETRON 4 MG: 2 INJECTION INTRAMUSCULAR; INTRAVENOUS at 07:50

## 2020-06-25 RX ADMIN — Medication 100 MCG: at 07:48

## 2020-06-25 RX ADMIN — Medication 100 MCG: at 07:47

## 2020-06-25 RX ADMIN — PROPOFOL 150 MG: 10 INJECTION, EMULSION INTRAVENOUS at 07:27

## 2020-06-25 RX ADMIN — FENTANYL CITRATE 25 MCG: 50 INJECTION, SOLUTION INTRAMUSCULAR; INTRAVENOUS at 07:42

## 2020-06-25 RX ADMIN — DEXAMETHASONE SODIUM PHOSPHATE 8 MG: 4 INJECTION, SOLUTION INTRAMUSCULAR; INTRAVENOUS at 07:44

## 2020-06-25 RX ADMIN — FENTANYL CITRATE 25 MCG: 50 INJECTION, SOLUTION INTRAMUSCULAR; INTRAVENOUS at 07:27

## 2020-06-25 RX ADMIN — ROCURONIUM BROMIDE 10 MG: 10 INJECTION, SOLUTION INTRAVENOUS at 07:27

## 2020-06-25 RX ADMIN — LIDOCAINE HYDROCHLORIDE 4 ML: 40 SOLUTION TOPICAL at 07:30

## 2020-06-25 RX ADMIN — Medication 10 MG: at 07:57

## 2020-06-25 RX ADMIN — CEFAZOLIN SODIUM 2 G: 10 INJECTION, POWDER, FOR SOLUTION INTRAVENOUS at 07:18

## 2020-06-25 RX ADMIN — Medication 10 MG: at 07:41

## 2020-06-25 RX ADMIN — SODIUM CHLORIDE, POTASSIUM CHLORIDE, SODIUM LACTATE AND CALCIUM CHLORIDE: 600; 310; 30; 20 INJECTION, SOLUTION INTRAVENOUS at 06:52

## 2020-06-25 RX ADMIN — Medication 10 MG: at 07:37

## 2020-06-25 RX ADMIN — Medication 10 MG: at 07:47

## 2020-06-25 RX ADMIN — Medication 100 MCG: at 07:58

## 2020-06-25 ASSESSMENT — PULMONARY FUNCTION TESTS
PIF_VALUE: 3
PIF_VALUE: 2
PIF_VALUE: 5
PIF_VALUE: 15
PIF_VALUE: 19
PIF_VALUE: 15
PIF_VALUE: 16
PIF_VALUE: 2
PIF_VALUE: 2
PIF_VALUE: 17
PIF_VALUE: 0
PIF_VALUE: 14
PIF_VALUE: 1
PIF_VALUE: 1
PIF_VALUE: 2
PIF_VALUE: 2
PIF_VALUE: 9
PIF_VALUE: 15
PIF_VALUE: 12
PIF_VALUE: 8
PIF_VALUE: 1
PIF_VALUE: 15
PIF_VALUE: 0
PIF_VALUE: 2
PIF_VALUE: 15
PIF_VALUE: 14
PIF_VALUE: 1
PIF_VALUE: 15
PIF_VALUE: 12
PIF_VALUE: 19
PIF_VALUE: 0
PIF_VALUE: 1
PIF_VALUE: 18
PIF_VALUE: 14
PIF_VALUE: 2
PIF_VALUE: 12
PIF_VALUE: 15
PIF_VALUE: 14
PIF_VALUE: 1
PIF_VALUE: 1
PIF_VALUE: 4
PIF_VALUE: 17
PIF_VALUE: 15
PIF_VALUE: 2
PIF_VALUE: 2
PIF_VALUE: 15
PIF_VALUE: 18
PIF_VALUE: 15

## 2020-06-25 ASSESSMENT — PAIN - FUNCTIONAL ASSESSMENT: PAIN_FUNCTIONAL_ASSESSMENT: 0-10

## 2020-06-25 ASSESSMENT — PAIN DESCRIPTION - DESCRIPTORS: DESCRIPTORS: ACHING

## 2020-06-25 NOTE — H&P
No change in H&P  Left lateral sided hip pain  For a bursectomy and IT band lengthening. Consented, no issues. Sincerely,    Jovon Phillips  [address]   Email: [email or web address]  Cell: [phone]    06/25/20  7:06 AM

## 2020-06-26 ENCOUNTER — OFFICE VISIT (OUTPATIENT)
Dept: ORTHOPEDIC SURGERY | Age: 75
End: 2020-06-26

## 2020-06-26 ENCOUNTER — HOSPITAL ENCOUNTER (OUTPATIENT)
Dept: PHYSICAL THERAPY | Age: 75
Setting detail: THERAPIES SERIES
Discharge: HOME | End: 2020-06-26
Payer: MEDICARE

## 2020-06-26 VITALS — TEMPERATURE: 98.6 F

## 2020-06-26 PROCEDURE — 97112 NEUROMUSCULAR REEDUCATION: CPT | Performed by: PHYSICAL THERAPIST

## 2020-06-26 PROCEDURE — 97161 PT EVAL LOW COMPLEX 20 MIN: CPT | Performed by: PHYSICAL THERAPIST

## 2020-06-26 PROCEDURE — 97110 THERAPEUTIC EXERCISES: CPT | Performed by: PHYSICAL THERAPIST

## 2020-06-26 PROCEDURE — 99024 POSTOP FOLLOW-UP VISIT: CPT | Performed by: ORTHOPAEDIC SURGERY

## 2020-06-26 NOTE — PLAN OF CARE
The [address] [place] [address] and [facility] [address]                                                         Physical Therapy Certification    Dear Referring Practitioner: Jacki Mills. Aditya Smith MD,    We had the pleasure of evaluating the following patient for physical therapy services at [address] [place] [address]. A summary of our findings can be found in the initial assessment below. This includes our plan of care. If you have any questions or concerns regarding these findings, please do not hesitate to contact me at the office phone number checked above. Thank you for the referral.       Physician Signature:_______________________________Date:__________________  By signing above (or electronic signature), therapists plan is approved by physician      Patient: Kayla Jefferson   : 1945   MRN: [id number]  Referring Physician: Referring Practitioner: Jacki Mills.  Aditya Smith MD      Evaluation Date: 2020      Medical Diagnosis Information:  Diagnosis: M70.61, M70.62 (ICD-10-CM) - Greater trochanteric bursitis of both hips   Treatment Diagnosis: M25.552, R26.2, R53.1                                         Insurance information: PT Insurance Information: PT BENEFITS 2020 FACILITY/ ANTHEM MEDIBLUE/ EFFECTIVE 18/ ACTIVE/ DED 0/ COPAY 35/ PAYS 100%/ OOP 6000 .04/ NO VISI TLIMIT MED NEC/ NO SEPERATE TELEHEALTH BENEFITS/ AIM AUTH REQ/ MONIQUE REF# [id number]/ 20 PAG     Precautions/ Contra-indications:     C-SSRS Triggered by Intake questionnaire (Past 2 wk assessment):   [x] No, Questionnaire did not trigger screening.   [] Yes, Patient intake triggered further evaluation      [] C-SSRS Screening completed  [] PCP notified via Plan of Care  [] Emergency services notified     Latex Allergy:  [x]NO      []YES  Preferred Language for Healthcare:   [x]English       []other:    SUBJECTIVE: Patient stated complaint: Pt presents to PT po day 1, s/p L ITB tenotomy and bursectomy. Pt states that she is feeling much better compared to before surgery. She took 1 tablet of hydrocodone but it made her feel terrible \"almost like I might be allergic. \" Pt iced this morning on her drive down. Pt was using a walker yesterday, states she felt so good when she woke up she thought she should try and go without it. Pt states that 2-3 years ago she noticed discomfort in her L hip driving home from ballroom dancing. States that it progressively got worse. In about May of last year she knew she needed to see the MD. Pt had about 5-6 steroid injection, the first few helped a lot but the last 2 didn't seem to do much of anything. Pt states she has been limping around the house and doing stairs step to step. She was unable to lay on her L side, walk very far or participate in dancing. She was using a heating \"quite a bit. \" Pt states that she also used Aleve to help with pain. Hobbies: ballroom dancing   Goals: \"be practically normal again\"    Pt will be continuing PT at [facility] location as it is closer to home. Relevant Medical History: see intake form  Functional Disability Index:    LEFS 19/80=23.75% (76.25% deficit)    Pain Scale: 4-5/10  Easing factors: rest, ice  Provocative factors: \"getting started\" walking     Type: [x]Constant   []Intermittent  []Radiating []Localized []other:     Numbness/Tingling: No    Occupation/School: Retired    Living Status/Prior Level of Function: Independent with ADLs and IADLs.      OBJECTIVE:     ROM PROM AROM Comments    Left Right Left Right    Flexion   120     Extension   Not assessed     Abduction   25     Adduction   Not assessed     ER 40    Supine 90/90   IR 30    Supine 90/90   Knee flex   115     Knee ext   0       Not assessed d/t recent surgery  Flexibility Left Right Comments   Hamstrings      ITB (Obers test)      Hip flexor(Roland test)      gastroc      Rectus femoris(Elys test)              Not assessed d/t recent assessment instrument and/or measurable assessment of functional outcome. [x] EVAL (LOW) 83881 (typically 20 minutes face-to-face)  [] EVAL (MOD) 06812 (typically 30 minutes face-to-face)  [] EVAL (HIGH) 58281 (typically 45 minutes face-to-face)  [] RE-EVAL       PLAN  Frequency/Duration:  2 days per week for 6 Weeks:  Interventions:  [x]  Therapeutic exercise including: strength training, ROM, for Lower extremity and core   [x]  NMR activation and proprioception for LE, Glutes and Core   [x]  Manual therapy as indicated for LE, Hip and spine to include: Dry Needling/IASTM, STM, PROM, Gr I-IV mobilizations, manipulation. [x] Modalities as needed that may include: thermal agents, E-stim, Biofeedback, US, iontophoresis as indicated  [x] Patient education on joint protection, postural re-education, activity modification, progression of HEP. HEP instruction:   Initiated 6/26/20. Printed hand out given. Pt demonstrated proper form of each exercise and expressed verbal understanding of frequency and duration. GOALS:   Patient stated goal: get back to normal    [] Progressing: [] Met: [] Not Met: [] Adjusted    Therapist goals for Patient:   Short Term Goals: To be achieved in: 2 weeks 7/10/20  1. Independent in HEP and progression per patient tolerance, in order to prevent re-injury. [] Progressing: [] Met: [] Not Met: [] Adjusted   2. Patient will have a decrease in pain to facilitate improvement in movement, function, and ADLs as indicated by Functional Deficits. [] Progressing: [] Met: [] Not Met: [] Adjusted     Long Term Goals: To be achieved in: 6 weeks 8/7/20  1. Disability index score of 25% or less for the LEFS to assist with reaching prior level of function. [] Progressing: [] Met: [] Not Met: [] Adjusted  2. Patient will demonstrate increased AROM to WNL to allow for proper joint functioning as indicated by patients Functional Deficits. [] Progressing: [] Met: [] Not Met: [] Adjusted  3.

## 2020-06-26 NOTE — FLOWSHEET NOTE
femoris(Elys test)                      Not assessed d/t recent surgery  Special  Test Left Right Comments   FABERS         Scour test         Impingement test         Trendelenburg test                      MMT not assessed d/t recent surgery  Strength Left Right Comments   Hip flexors         Hip extension         Hip abduction         Hip adduction         Hip ER         Hip IR         Quads         Hamstrings            Joint mobility:  Hip not assessed d/t recent surgery              []?Normal                       []?Hypo              []?Hyper     Palpation: Generalized soreness/TTP around surgical site     Functional Mobility/Transfers: Independent but moving slowly d/t recent surgery     Posture: Rounded shoulders     Bandages/Dressings/Incisions:   6/26/20 post op dressing over incision, left in place per MD orders     Gait:   6/26/20 FWB without AD. Decreased stance time on LLE. Decreased stride length. + limp       RESTRICTIONS/PRECAUTIONS:     Exercises/Interventions:     ROM/STRETCHES     Seated hamstring  3x30\"    Seated calf 3x30\"    Single knee to chest 3x30\"    Supine heel slide x20                        PREs     Quad sets 10x10\"    Glute sets 10x10\"    ADD sets 10x10\"                                                 Manual interventions            Plan for next session: progress as tolerated, continue at [facility] location. Therapeutic Exercise and NMR EXR  [x] (12596) Provided verbal/tactile cueing for activities related to strengthening, flexibility, endurance, ROM for improvements in LE, proximal hip, and core control with self care, mobility, lifting, ambulation.  [] (80995) Provided verbal/tactile cueing for activities related to improving balance, coordination, kinesthetic sense, posture, motor skill, proprioception  to assist with LE, proximal hip, and core control in self care, mobility, lifting, ambulation and eccentric single leg control.      NMR and Therapeutic Activities:    [] (25924

## 2020-06-26 NOTE — PROGRESS NOTES
current or ex partner: None        Emotionally abused: None        Physically abused: None        Forced sexual activity: None    Other Topics      Concerns:        None    Social History Narrative      None      Current Outpatient Medications:  HYDROcodone-acetaminophen (NORCO) 5-325 MG per tablet, Take 1 tablet by mouth every 6 hours as needed for Pain for up to 5 days. Intended supply: 7 days. Take lowest dose possible to manage pain, Disp: 20 tablet, Rfl: 0  pantoprazole (PROTONIX) 40 MG tablet, TAKE 1 TABLET BY MOUTH EVERY DAY, Disp: , Rfl: 2  oxybutynin (DITROPAN-XL) 5 MG CR tablet, daily , Disp: , Rfl:   ALPRAZolam (XANAX) 0.25 MG tablet, Take 0.25 mg by mouth 5 times daily. , Disp: , Rfl:   FLUoxetine (PROZAC) 20 MG capsule, Take 40 mg by mouth 2 times daily , Disp: , Rfl:   Probiotic Product (PROBIOTIC DAILY PO), Take 1 tablet by mouth daily. , Disp: , Rfl:   FIBER PO, Take 5 tablets by mouth daily Pt has diverticulitis, Disp: , Rfl:    Multiple Vitamins-Minerals (MULTIVITAMIN PO), Take 1 tablet by mouth daily. , Disp: , Rfl:   gabapentin (NEURONTIN) 100 MG capsule, Take 1,000 mg by mouth daily. 100 mg 4xs daily and 600mg at night, Disp: , Rfl:   meloxicam (MOBIC) 15 MG tablet, Take 15 mg by mouth daily. , Disp: , Rfl:     No current facility-administered medications for this visit. -- Codeine -- Nausea And Vomiting   -- Meperidine -- Nausea And Vomiting   -- Oxycodone-Acetaminophen -- Nausea And Vomiting   -- Percocet [Oxycodone-Acetaminophen] -- Rash   -- Propoxyphene -- Nausea And Vomiting    VITAL SIGNS:  Temp 98.6 °F (37 °C)   Impression doing well 1 day status post left hip IT band tenotomy and bursectomy. Plan: She can start physical therapy. Should be partial weightbearing for 7 to 10 days until she is comfortable without walking aids. She is to call for other problems otherwise we will see her back in a month.

## 2020-07-01 ENCOUNTER — HOSPITAL ENCOUNTER (OUTPATIENT)
Dept: PHYSICAL THERAPY | Age: 75
Setting detail: THERAPIES SERIES
Discharge: HOME | End: 2020-07-01
Payer: MEDICARE

## 2020-07-01 PROCEDURE — 97161 PT EVAL LOW COMPLEX 20 MIN: CPT

## 2020-07-01 NOTE — PLAN OF CARE
[facility]  Phone: [phone]   Fax:     [phone]                                                       Physical Therapy Certification    Dear Referring Practitioner: Jason Jason. Dania Crane MD,    We had the pleasure of evaluating the following patient for physical therapy services at [address] [place] [address]. A summary of our findings can be found in the initial assessment below. This includes our plan of care. If you have any questions or concerns regarding these findings, please do not hesitate to contact me at the office phone number checked above. Thank you for the referral.       Physician Signature:_______________________________Date:__________________  By signing above (or electronic signature), therapists plan is approved by physician      Patient: Ashley Bauer   : 1945   MRN: [id number]  Referring Physician: Referring Practitioner: Jason Jason.  Dania Crane MD      Evaluation Date: 2020      Medical Diagnosis Information:  Diagnosis: M70.61, M70.62 (ICD-10-CM) - Greater trochanteric bursitis of both hips   Treatment Diagnosis: M25.552, R26.2, R53.1                                         Insurance information: PT Insurance Information: PT BENEFITS 2020 FACILITY/ ANTHEM MEDIBLUE/ EFFECTIVE 18/ ACTIVE/ DED 0/ COPAY 35/ PAYS 100%/ OOP 6000 .04/ NO VISI TLIMIT MED NEC/ NO SEPERATE TELEHEALTH BENEFITS/ AIM AUTH REQ/ MONIQUE REF# [id number]/ 20 PAG     Precautions/ Contra-indications: L ITB tenotomy w/ bursectomy DOS: 2020  Latex Allergy:  [x]NO      []YES  Preferred Language for Healthcare:   [x]English       []other:    C-SSRS Triggered by Intake questionnaire (Past 2 wk assessment ):   [x] No, Questionnaire did not trigger screening.   [] Yes, Patient intake triggered C-SSRS Screening      [] C-SSRS of post-op L hip. Upon assessment, pt presents w/ slight limitations in L hip ROM, decreased glute facilitation, decreased gait speed, and increased tenderness. Functional Impairments:     [x]Noted lumbar/proximal hip/LE hypomobility   [x]Decreased LE functional ROM   [x]Decreased core/proximal hip strength and neuromuscular control   [x]Decreased LE functional strength   [x]Reduced balance/proprioceptive control   []other:      Functional Activity Limitations (from functional questionnaire and intake)   [x]Reduced ability to tolerate prolonged functional positions   []Reduced ability or difficulty with changes of positions or transfers between positions   []Reduced ability to maintain good posture and demonstrate good body mechanics with sitting, bending, and lifting   [x]Reduced ability to sleep   [] Reduced ability or tolerance with driving and/or computer work   []Reduced ability to perform lifting, carrying tasks   [x]Reduced ability to squat   []Reduced ability to forward bend   [x]Reduced ability to ambulate prolonged functional periods/distances/surfaces   [x]Reduced ability to ascend/descend stairs   [x]Reduced ability to run, hop or jump   [x]other: dance     Participation Restrictions   [x]Reduced participation in self care activities   []Reduced participation in home management activities   []Reduced participation in work activities   [x]Reduced participation in social activities. []Reduced participation in sport activities. Classification :    [x]Signs/symptoms consistent with post-surgical status including decreased ROM, strength and function.    []Signs/symptoms consistent with joint sprain/strain   []Signs/symptoms consistent with patella-femoral syndrome   []Signs/symptoms consistent with knee OA/hip OA   []Signs/symptoms consistent with internal derangement of knee/Hip   []Signs/symptoms consistent with functional hip weakness/NMR control      []Signs/symptoms consistent with tendinitis/tendinosis    []signs/symptoms consistent with pathology which may benefit from Dry needling      []other:      Prognosis/Rehab Potential:      [x]Excellent   []Good    []Fair   []Poor    Tolerance of evaluation/treatment:    [x]Excellent   []Good    []Fair   []Poor    Physical Therapy Evaluation Complexity Justification  [x] A history of present problem with:  [] no personal factors and/or comorbidities that impact the plan of care;  [x]1-2 personal factors and/or comorbidities that impact the plan of care  []3 personal factors and/or comorbidities that impact the plan of care  [x] An examination of body systems using standardized tests and measures addressing any of the following: body structures and functions (impairments), activity limitations, and/or participation restrictions;:  [x] a total of 1-2 or more elements   [] a total of 3 or more elements   [] a total of 4 or more elements   [x] A clinical presentation with:  [x] stable and/or uncomplicated characteristics   [] evolving clinical presentation with changing characteristics  [] unstable and unpredictable characteristics;   [x] Clinical decision making of [x] low, [] moderate, [] high complexity using standardized patient assessment instrument and/or measurable assessment of functional outcome. [x] EVAL (LOW) 14496 (typically 20 minutes face-to-face)  [] EVAL (MOD) 32432 (typically 30 minutes face-to-face)  [] EVAL (HIGH) 61941 (typically 45 minutes face-to-face)  [] RE-EVAL     PLAN:  Frequency/Duration:  1-2 days per week for 8 Weeks:  Interventions:  [x]  Therapeutic exercise including: strength training, ROM, for Lower extremity and core   [x]  NMR activation and proprioception for LE, Glutes and Core   [x]  Manual therapy as indicated for LE, Hip and spine to include: Dry Needling/IASTM, STM, PROM, Gr I-IV mobilizations, manipulation.    [x] Modalities as needed that may include: thermal agents, E-stim, Biofeedback, US, iontophoresis as indicated  [x] Patient education on joint protection, postural re-education, activity modification, progression of HEP. HEP instruction: hip flexion, glute med facilitation, LAQ     GOALS:  Patient stated goal: \"get back to ballroom dancing\"  [] Progressing: [] Met: [x] Not Met: [] Adjusted    Therapist goals for Patient:   Short Term Goals: To be achieved in: 2 weeks  1. Independent in HEP and progression per patient tolerance, in order to prevent re-injury. [] Progressing: [] Met: [x] Not Met: [] Adjusted  2. Patient will have a decrease in pain to facilitate improvement in movement, function, and ADLs as indicated by Functional Deficits. [] Progressing: [] Met: [x] Not Met: [] Adjusted    Long Term Goals: To be achieved in: 8 weeks  1. Disability index score of 20% or less for the LEFS to assist with reaching prior level of function. [] Progressing: [] Met: [x] Not Met: [] Adjusted  2. Patient will demonstrate increased AROM to full hip IR/ER to allow for proper joint functioning as indicated by patients Functional Deficits. [] Progressing: [] Met: [x] Not Met: [] Adjusted  3. Patient will demonstrate an increase in Strength to good proximal hip strength and control, within 5lb HHD in LE to allow for proper functional mobility as indicated by patients Functional Deficits. [] Progressing: [] Met: [x] Not Met: [] Adjusted  4. Patient will return to functional activities including position transfers and ambulation without increased symptoms or restriction. [] Progressing: [] Met: [x] Not Met: [] Adjusted  5. Pt will return to ballroom dancing without increased sx or restriction.    [] Progressing: [] Met: [x] Not Met: [] Adjusted     Electronically signed by:  Iggy Segundo, PT

## 2020-07-01 NOTE — FLOWSHEET NOTE
walks      RDL      Slide Lunge      Slide HS eccentrics      Step ups/ecc step down      Swissball wall rolls- in SLS- hip drive      Quad hip ext/wall-ball rolls      Supine hip flexion AAROM 2 10          Manual Intervention (33456)  Min:      Knee mobs/PROM 15  Hip ROM to tolerance   Tib/Fem Mobs      Patella Mobs      Ankle mobs                  NMR re-education (42334)  Min:   CUES NEEDED   Ethiopian/Biofeedback 10/10      BFR      G. Med activation 2 10    Hip Ext full ROM/ G. Activation      Bosu Bal and Prop- G Med      Single leg stance/Balance/Prop      Bosu Retro G. Med act      Prone Hip froggers- sliders/elevated            Therapeutic Activity (23001)  Min:      Ladders      Plyos      Dynamic Balance                            Therapeutic Exercise and NMR EXR  [x] (52652) Provided verbal/tactile cueing for activities related to strengthening, flexibility, endurance, ROM for improvements in LE, proximal hip, and core control with self care, mobility, lifting, ambulation. [x] (19301) Provided verbal/tactile cueing for activities related to improving balance, coordination, kinesthetic sense, posture, motor skill, proprioception  to assist with LE, proximal hip, and core control in self care, mobility, lifting, ambulation and eccentric single leg control.      NMR and Therapeutic Activities:    [x] (62257 or 30195) Provided verbal/tactile cueing for activities related to improving balance, coordination, kinesthetic sense, posture, motor skill, proprioception and motor activation to allow for proper function of core, proximal hip and LE with self care and ADLs and functional mobility.   [] (75066) Gait Re-education- Provided training and instruction to the patient for proper LE, core and proximal hip recruitment and positioning and eccentric body weight control with ambulation re-education including up and down stairs     Home Exercise Program:    [x] (68629) Reviewed/Progressed HEP activities related to strengthening, flexibility, endurance, ROM of core, proximal hip and LE for functional self-care, mobility, lifting and ambulation/stair navigation   [] (70197)Reviewed/Progressed HEP activities related to improving balance, coordination, kinesthetic sense, posture, motor skill, proprioception of core, proximal hip and LE for self care, mobility, lifting, and ambulation/stair navigation      Manual Treatments:  PROM / STM / Oscillations-Mobs:  G-I, II, III, IV (PA's, Inf., Post.)  [x] (47313) Provided manual therapy to mobilize LE, proximal hip and/or LS spine soft tissue/joints for the purpose of modulating pain, promoting relaxation,  increasing ROM, reducing/eliminating soft tissue swelling/inflammation/restriction, improving soft tissue extensibility and allowing for proper ROM for normal function with self care, mobility, lifting and ambulation. Modalities:     [] GAME READY (VASO)- for significant edema, swelling, pain control. Charges:  Timed Code Treatment Minutes: 20   Total Treatment Minutes: 45      [x] EVAL (LOW) 88269 (typically 20 minutes face-to-face)  [] EVAL (MOD) 17342 (typically 30 minutes face-to-face)  [] EVAL (HIGH) 28172 (typically 45 minutes face-to-face)  [] RE-EVAL     [] KN(70871) x     [] DRY NEEDLE 1 OR 2 MUSCLES  [] NMR (08897) x     [] DRY NEEDLE 3+ MUSCLES  [] Manual (06343) x       [] TA (35970) x     [] Mech Traction (01903)  [] ES(attended) (52485)     [] ES (un) (13154):   [] VASO (00711)  [] Other:        GOALS:  Patient stated goal: \"get back to ballroom dancing\"  []? Progressing: []? Met: [x]? Not Met: []? Adjusted     Therapist goals for Patient:   Short Term Goals: To be achieved in: 2 weeks  1. Independent in HEP and progression per patient tolerance, in order to prevent re-injury. []? Progressing: []? Met: [x]? Not Met: []? Adjusted  2.  Patient will have a decrease in pain to facilitate improvement in movement, function, and ADLs as indicated by Functional Deficits. []? Progressing: []? Met: [x]? Not Met: []? Adjusted     Long Term Goals: To be achieved in: 8 weeks  1. Disability index score of 20% or less for the LEFS to assist with reaching prior level of function. []? Progressing: []? Met: [x]? Not Met: []? Adjusted  2. Patient will demonstrate increased AROM to full hip IR/ER to allow for proper joint functioning as indicated by patients Functional Deficits. []? Progressing: []? Met: [x]? Not Met: []? Adjusted  3. Patient will demonstrate an increase in Strength to good proximal hip strength and control, within 5lb HHD in LE to allow for proper functional mobility as indicated by patients Functional Deficits. []? Progressing: []? Met: [x]? Not Met: []? Adjusted  4. Patient will return to functional activities including position transfers and ambulation without increased symptoms or restriction. []? Progressing: []? Met: [x]? Not Met: []? Adjusted  5. Pt will return to ballroom dancing without increased sx or restriction. []? Progressing: []? Met: [x]? Not Met: []? Adjusted        ASSESSMENT:  See eval         Treatment/Activity Tolerance:  [x] Patient tolerated treatment well [] Patient limited by fatique  [] Patient limited by pain  [] Patient limited by other medical complications  [] Other:     Overall Progression Towards Functional goals/ Treatment Progress Update:  [] Patient is progressing as expected towards functional goals listed. [] Progression is slowed due to complexities/Impairments listed. [] Progression has been slowed due to co-morbidities.   [x] Plan just implemented, too soon to assess goals progression <30days   [] Goals require adjustment due to lack of progress  [] Patient is not progressing as expected and requires additional follow up with physician  [] Other    Prognosis for POC: [x] Good [] Fair  [] Poor    Patient requires continued skilled intervention: [x] Yes  [] No        PLAN: See eval  [] Continue per plan of care [] Alter current plan (see comments)  [x] Plan of care initiated [] Hold pending MD visit [] Discharge    Electronically signed by: Bobo Sosa PT    Note: If patient does not return for scheduled/recommended follow up visits, this note will serve as a discharge from care along with the most recent update on progress.

## 2020-07-06 ENCOUNTER — HOSPITAL ENCOUNTER (OUTPATIENT)
Dept: PHYSICAL THERAPY | Age: 75
Setting detail: THERAPIES SERIES
Discharge: HOME | End: 2020-07-06
Payer: MEDICARE

## 2020-07-06 PROCEDURE — 97110 THERAPEUTIC EXERCISES: CPT

## 2020-07-06 PROCEDURE — 97140 MANUAL THERAPY 1/> REGIONS: CPT

## 2020-07-06 NOTE — FLOWSHEET NOTE
[facility] [address] [facility], [address] [address]  Phone: [phone] Fax: [phone]    Physical Therapy Treatment Note/ Progress Report:     Date:  2020    Patient Name:  Dorita Soni    :    MRN: [id number]  Restrictions/Precautions:    Medical/Treatment Diagnosis Information:  · Diagnosis: M70.61, M70.62 (ICD-10-CM) - Greater trochanteric bursitis of both hips  · Treatment Diagnosis: M25.552, R26.2, X18.3  Insurance/Certification information:  PT Insurance Information: PT BENEFITS 2020 FACILITY/ ANTHEM MEDIBLUE/ EFFECTIVE 18/ ACTIVE/ DED 0/ COPAY 35/ PAYS 100%/ OOP 6000 .04/ NO VISI TLIMIT MED NEC/ NO SEPERATE TELEHEALTH BENEFITS/ Bacilio Sarath REQ/ MONIQUE REF# [id number]/ 20 PAG  Physician Information:  Referring Practitioner: Daniel Nobles. Dion Wallis MD  Plan of care signed (Y/N):     Date of Patient follow up with Physician: 1 month     Progress Report: []  Yes  [x]  No     Date Range for reporting period:  Beginnin2020  Ending:      Progress report due (10 Rx/or 30 days whichever is less):      Recertification due (POC duration/ or 90 days whichever is less): 2020     Visit # Insurance Allowable Auth Needed   2 MN [x]Yes   []No     Latex Allergy:  [x]NO      []YES  Preferred Language for Healthcare:   [x]English       []other:  Functional Scale: LEFS:  = 68% disability Date assessed:2020    Pain level: 0/10     SUBJECTIVE:  Pt states her hip is starting to feel better and not giving her as much trouble. Does have some minor soreness in the front of the hip which she thinks is from starting to be more active on her feet.      OBJECTIVE: See eval   Observation:    Test measurements:      RESTRICTIONS/PRECAUTIONS: L ITB tenotomy w/ bursectomy DOS: 2020    Exercises/Interventions:     Therapeutic Ex (72088)   Min: 30 Sets/sec Reps Notes/CUES   Retro Stepper/BIKE 7'     Alter G      BFR Sportcord March      3 way SLR 2 10    SAQ 2 10    Clam ABD 2 10    Hip Ext /table 2 10 Cues    BOSU fwd/side lunge      BOSU squat      Leg Press Iso/Con/Ecc 0- 70 20    Cybex HS curl      TKE      Glute side walks      RDL      Slide Lunge      Slide HS eccentrics      Step ups/ecc step down      Swissball wall rolls- in SLS- hip drive      Quad hip ext/wall-ball rolls      Supine hip flexion AAROM 2 10    SC push 1 10    Manual Intervention (63875)  Min:      Knee mobs/PROM 15  Hip ROM to tolerance   Tib/Fem Mobs      Patella Mobs      Ankle mobs                  NMR re-education (80912)  Min:   CUES NEEDED   Samoan/Biofeedback 10/10      BFR      G. Med activation 2 10    Hip Ext full ROM/ G. Activation      Bosu Bal and Prop- G Med      Single leg stance/Balance/Prop      Bosu Retro G. Med act      Prone Hip froggers- sliders/elevated            Therapeutic Activity (07779)  Min:      Ladders      Plyos      Dynamic Balance                            Therapeutic Exercise and NMR EXR  [x] (99765) Provided verbal/tactile cueing for activities related to strengthening, flexibility, endurance, ROM for improvements in LE, proximal hip, and core control with self care, mobility, lifting, ambulation. [x] (63347) Provided verbal/tactile cueing for activities related to improving balance, coordination, kinesthetic sense, posture, motor skill, proprioception  to assist with LE, proximal hip, and core control in self care, mobility, lifting, ambulation and eccentric single leg control.      NMR and Therapeutic Activities:    [x] (29118 or 94338) Provided verbal/tactile cueing for activities related to improving balance, coordination, kinesthetic sense, posture, motor skill, proprioception and motor activation to allow for proper function of core, proximal hip and LE with self care and ADLs and functional mobility.   [] (62447) Gait Re-education- Provided training and instruction to the patient for proper LE, core and proximal hip recruitment and positioning and eccentric body weight control with ambulation re-education including up and down stairs     Home Exercise Program:    [x] (35741) Reviewed/Progressed HEP activities related to strengthening, flexibility, endurance, ROM of core, proximal hip and LE for functional self-care, mobility, lifting and ambulation/stair navigation   [] (21099)Reviewed/Progressed HEP activities related to improving balance, coordination, kinesthetic sense, posture, motor skill, proprioception of core, proximal hip and LE for self care, mobility, lifting, and ambulation/stair navigation      Manual Treatments:  PROM / STM / Oscillations-Mobs:  G-I, II, III, IV (PA's, Inf., Post.)  [x] (03026) Provided manual therapy to mobilize LE, proximal hip and/or LS spine soft tissue/joints for the purpose of modulating pain, promoting relaxation,  increasing ROM, reducing/eliminating soft tissue swelling/inflammation/restriction, improving soft tissue extensibility and allowing for proper ROM for normal function with self care, mobility, lifting and ambulation. Modalities:     [] GAME READY (VASO)- for significant edema, swelling, pain control. Charges:  Timed Code Treatment Minutes: 45   Total Treatment Minutes: 45      [] EVAL (LOW) 27895 (typically 20 minutes face-to-face)  [] EVAL (MOD) 66726 (typically 30 minutes face-to-face)  [] EVAL (HIGH) 53772 (typically 45 minutes face-to-face)  [] RE-EVAL     [x] GH(46594) x 2    [] DRY NEEDLE 1 OR 2 MUSCLES  [] NMR (28097) x     [] DRY NEEDLE 3+ MUSCLES  [x] Manual (49391) x 1      [] TA (69928) x     [] Mech Traction (05501)  [] ES(attended) (99361)     [] ES (un) (82374):   [] VASO (61377)  [] Other:        GOALS:  Patient stated goal: \"get back to ballroom dancing\"  []? Progressing: []? Met: [x]? Not Met: []? Adjusted     Therapist goals for Patient:   Short Term Goals: To be achieved in: 2 weeks  1.  Independent in HEP and progression per patient tolerance, in order to prevent re-injury. []? Progressing: []? Met: [x]? Not Met: []? Adjusted  2. Patient will have a decrease in pain to facilitate improvement in movement, function, and ADLs as indicated by Functional Deficits. []? Progressing: []? Met: [x]? Not Met: []? Adjusted     Long Term Goals: To be achieved in: 8 weeks  1. Disability index score of 20% or less for the LEFS to assist with reaching prior level of function. []? Progressing: []? Met: [x]? Not Met: []? Adjusted  2. Patient will demonstrate increased AROM to full hip IR/ER to allow for proper joint functioning as indicated by patients Functional Deficits. []? Progressing: []? Met: [x]? Not Met: []? Adjusted  3. Patient will demonstrate an increase in Strength to good proximal hip strength and control, within 5lb HHD in LE to allow for proper functional mobility as indicated by patients Functional Deficits. []? Progressing: []? Met: [x]? Not Met: []? Adjusted  4. Patient will return to functional activities including position transfers and ambulation without increased symptoms or restriction. []? Progressing: []? Met: [x]? Not Met: []? Adjusted  5. Pt will return to ballroom dancing without increased sx or restriction. []? Progressing: []? Met: [x]? Not Met: []? Adjusted        ASSESSMENT:  Good tolerance to therapy. ROM doing very well, still w/ significant bruising lateral hip. Cues for glute facilitation needed. Increased fatigue. Treatment/Activity Tolerance:  [x] Patient tolerated treatment well [] Patient limited by fatique  [] Patient limited by pain  [] Patient limited by other medical complications  [] Other:     Overall Progression Towards Functional goals/ Treatment Progress Update:  [] Patient is progressing as expected towards functional goals listed. [] Progression is slowed due to complexities/Impairments listed. [] Progression has been slowed due to co-morbidities.   [x] Plan just implemented, too soon to assess goals progression <30days   [] Goals require adjustment due to lack of progress  [] Patient is not progressing as expected and requires additional follow up with physician  [] Other    Prognosis for POC: [x] Good [] Fair  [] Poor    Patient requires continued skilled intervention: [x] Yes  [] No        PLAN: See eval  [x] Continue per plan of care [] Alter current plan (see comments)  [] Plan of care initiated [] Hold pending MD visit [] Discharge    Electronically signed by: Timur Alarcon PT    Note: If patient does not return for scheduled/recommended follow up visits, this note will serve as a discharge from care along with the most recent update on progress.

## 2020-07-08 ENCOUNTER — HOSPITAL ENCOUNTER (OUTPATIENT)
Dept: PHYSICAL THERAPY | Age: 75
Setting detail: THERAPIES SERIES
Discharge: HOME | End: 2020-07-08
Payer: MEDICARE

## 2020-07-08 ENCOUNTER — TELEPHONE (OUTPATIENT)
Dept: ORTHOPEDIC SURGERY | Age: 75
End: 2020-07-08

## 2020-07-08 PROCEDURE — 97140 MANUAL THERAPY 1/> REGIONS: CPT

## 2020-07-08 PROCEDURE — 97110 THERAPEUTIC EXERCISES: CPT

## 2020-07-08 NOTE — TELEPHONE ENCOUNTER
Patient is calling requesting a call back. Would like to know if she can take off her bandage or is she should leave it on until her schedule visit on 7/29.  Ph [phone]

## 2020-07-08 NOTE — TELEPHONE ENCOUNTER
Called and spoke with patient -- advised at next PT to remove dressing check incisison and if sutures have them remove sutures

## 2020-07-08 NOTE — FLOWSHEET NOTE
Clam ABD 2 10    Hip Ext /table 2 10 Cues    BOSU fwd/side lunge      BOSU squat      Leg Press Iso/Con/Ecc 0- 80# 3x10    Cybex HS curl      TKE      Glute side walks orange 2 laps    RDL      Slide Lunge      Slide HS eccentrics      Step ups/ecc step down      Swissball wall rolls- in SLS- hip drive      Quad hip ext/wall-ball rolls      Supine hip flexion AAROM 2 10    SC push 1 10    Manual Intervention (02659)  Min: 10      Knee mobs/PROM 5  Hip ROM to tolerance   Tib/Fem Mobs      Patella Mobs      Ankle mobs      IASTM 5  Distal IT band         NMR re-education (89130)  Min:   CUES NEEDED   S[place] Arabian/Biofeedback 10/10      BFR      G. Med activation 2 10    Hip Ext full ROM/ G. Activation      Bosu Bal and Prop- G Med      Single leg stance/Balance/Prop      Bosu Retro G. Med act      Prone Hip froggers- sliders/elevated            Therapeutic Activity (03245)  Min:      Ladders      Plyos      Dynamic Balance                            Therapeutic Exercise and NMR EXR  [x] (52072) Provided verbal/tactile cueing for activities related to strengthening, flexibility, endurance, ROM for improvements in LE, proximal hip, and core control with self care, mobility, lifting, ambulation. [x] (82778) Provided verbal/tactile cueing for activities related to improving balance, coordination, kinesthetic sense, posture, motor skill, proprioception  to assist with LE, proximal hip, and core control in self care, mobility, lifting, ambulation and eccentric single leg control.      NMR and Therapeutic Activities:    [x] (48483 or 47763) Provided verbal/tactile cueing for activities related to improving balance, coordination, kinesthetic sense, posture, motor skill, proprioception and motor activation to allow for proper function of core, proximal hip and LE with self care and ADLs and functional mobility.   [] (46753) Gait Re-education- Provided training and instruction to the patient for proper LE, core and proximal hip recruitment and positioning and eccentric body weight control with ambulation re-education including up and down stairs     Home Exercise Program:    [x] (24708) Reviewed/Progressed HEP activities related to strengthening, flexibility, endurance, ROM of core, proximal hip and LE for functional self-care, mobility, lifting and ambulation/stair navigation   [] (21549)Reviewed/Progressed HEP activities related to improving balance, coordination, kinesthetic sense, posture, motor skill, proprioception of core, proximal hip and LE for self care, mobility, lifting, and ambulation/stair navigation      Manual Treatments:  PROM / STM / Oscillations-Mobs:  G-I, II, III, IV (PA's, Inf., Post.)  [x] (51280) Provided manual therapy to mobilize LE, proximal hip and/or LS spine soft tissue/joints for the purpose of modulating pain, promoting relaxation,  increasing ROM, reducing/eliminating soft tissue swelling/inflammation/restriction, improving soft tissue extensibility and allowing for proper ROM for normal function with self care, mobility, lifting and ambulation. Modalities:     [] GAME READY (VASO)- for significant edema, swelling, pain control. Charges:  Timed Code Treatment Minutes: 45   Total Treatment Minutes: 45      [] EVAL (LOW) 27342 (typically 20 minutes face-to-face)  [] EVAL (MOD) 11978 (typically 30 minutes face-to-face)  [] EVAL (HIGH) 26609 (typically 45 minutes face-to-face)  [] RE-EVAL     [x] EE(10563) x 2    [] DRY NEEDLE 1 OR 2 MUSCLES  [] NMR (01500) x     [] DRY NEEDLE 3+ MUSCLES  [x] Manual (31357) x 1      [] TA (53643) x     [] [facility] (997[facility]  [] ES(attended) (72000)     [] ES (un) (43034):   [] VASO (87024)  [] Other:        GOALS:  Patient stated goal: \"get back to ballroom dancing\"  []? Progressing: []? Met: [x]? Not Met: []? Adjusted     Therapist goals for Patient:   Short Term Goals: To be achieved in: 2 weeks  1.  Independent in HEP and progression per patient tolerance, in order implemented, too soon to assess goals progression <30days   [] Goals require adjustment due to lack of progress  [] Patient is not progressing as expected and requires additional follow up with physician  [] Other    Prognosis for POC: [x] Good [] Fair  [] Poor    Patient requires continued skilled intervention: [x] Yes  [] No        PLAN: Continue proximal hip ROM and strengthening as tolerated  [x] Continue per plan of care [] Alter current plan (see comments)  [] Plan of care initiated [] Hold pending MD visit [] Discharge    Electronically signed by: Mando Mijares PT    Note: If patient does not return for scheduled/recommended follow up visits, this note will serve as a discharge from care along with the most recent update on progress.

## 2020-07-13 ENCOUNTER — HOSPITAL ENCOUNTER (OUTPATIENT)
Dept: PHYSICAL THERAPY | Age: 75
Setting detail: THERAPIES SERIES
Discharge: HOME | End: 2020-07-13
Payer: MEDICARE

## 2020-07-13 PROCEDURE — 97140 MANUAL THERAPY 1/> REGIONS: CPT

## 2020-07-13 PROCEDURE — 97110 THERAPEUTIC EXERCISES: CPT

## 2020-07-13 NOTE — FLOWSHEET NOTE
[place] [address] [facility]  Phone: [phone] Fax: [phone]    Physical Therapy Treatment Note/ Progress Report:     Date:  2020    Patient Name:  Concepcion Pacheco    :  [birth year]  MRN: [id number]  Restrictions/Precautions:    Medical/Treatment Diagnosis Information:  · Diagnosis: M70.61, M70.62 (ICD-10-CM) - Greater trochanteric bursitis of both hips  · Treatment Diagnosis: M25.552, R26.2, B73.1  Insurance/Certification information:  PT Insurance Information: PT BENEFITS 2020 FACILITY/ ANTHEM MEDIBLUE/ EFFECTIVE 18/ ACTIVE/ DED 0/ COPAY 35/ PAYS 100%/ OOP 6000 .04/ NO VISI TLIMIT MED NEC/ NO SEPERATE TELEHEALTH BENEFITS/ Elvira Lambert REQ/ MONIQUE REF# [id number]/ 20 PAG  Physician Information:  Referring Practitioner: Branden Kessler. Charanjit Garrison MD  Plan of care signed (Y/N):     Date of Patient follow up with Physician: 1 month     Progress Report: []  Yes  [x]  No     Date Range for reporting period:  Beginnin2020  Ending:      Progress report due (10 Rx/or 30 days whichever is less): 4846     Recertification due (POC duration/ or 90 days whichever is less): 2020     Visit # Insurance Allowable Auth Needed   4 MN [x]Yes   []No     Latex Allergy:  [x]NO      []YES  Preferred Language for Healthcare:   [x]English       []other:  Functional Scale: LEFS:  = 68% disability Date assessed:2020    Pain level: 0/10     SUBJECTIVE:  Pt states her hip is feeling good. Feels like she continues to get stronger every day. Removed topical bandage after calling surgeon's nurse, still has steri strips on.      OBJECTIVE: See eval   Observation:    Test measurements:      RESTRICTIONS/PRECAUTIONS: L ITB tenotomy w/ bursectomy DOS: 2020    Exercises/Interventions:     Therapeutic Ex (17314)   Min: 30 Sets/sec Reps Notes/CUES   Retro Stepper/BIKE 7'     Alter G      BFR      Sportcord March      3 way LE, core and proximal hip recruitment and positioning and eccentric body weight control with ambulation re-education including up and down stairs     Home Exercise Program:    [x] (05945) Reviewed/Progressed HEP activities related to strengthening, flexibility, endurance, ROM of core, proximal hip and LE for functional self-care, mobility, lifting and ambulation/stair navigation   [] (74208)Reviewed/Progressed HEP activities related to improving balance, coordination, kinesthetic sense, posture, motor skill, proprioception of core, proximal hip and LE for self care, mobility, lifting, and ambulation/stair navigation      Manual Treatments:  PROM / STM / Oscillations-Mobs:  G-I, II, III, IV (PA's, Inf., Post.)  [x] (48847) Provided manual therapy to mobilize LE, proximal hip and/or LS spine soft tissue/joints for the purpose of modulating pain, promoting relaxation,  increasing ROM, reducing/eliminating soft tissue swelling/inflammation/restriction, improving soft tissue extensibility and allowing for proper ROM for normal function with self care, mobility, lifting and ambulation. Modalities:     [] GAME READY (VASO)- for significant edema, swelling, pain control. Charges:  Timed Code Treatment Minutes: 45   Total Treatment Minutes: 45      [] EVAL (LOW) 72707 (typically 20 minutes face-to-face)  [] EVAL (MOD) 28936 (typically 30 minutes face-to-face)  [] EVAL (HIGH) 78497 (typically 45 minutes face-to-face)  [] RE-EVAL     [x] DY(87330) x 2    [] DRY NEEDLE 1 OR 2 MUSCLES  [] NMR (79716) x     [] DRY NEEDLE 3+ MUSCLES  [x] Manual (40380) x 1      [] TA (28094) x     [] [facility] (90552[facility]  [] ES(attended) (67992)     [] ES (un) (80656):   [] VASO (44543)  [] Other:        GOALS:  Patient stated goal: \"get back to ballroom dancing\"  []? Progressing: []? Met: [x]? Not Met: []? Adjusted     Therapist goals for Patient:   Short Term Goals: To be achieved in: 2 weeks  1.  Independent in HEP and progression per patient tolerance, in order to prevent re-injury. []? Progressing: []? Met: [x]? Not Met: []? Adjusted  2. Patient will have a decrease in pain to facilitate improvement in movement, function, and ADLs as indicated by Functional Deficits. []? Progressing: []? Met: [x]? Not Met: []? Adjusted     Long Term Goals: To be achieved in: 8 weeks  1. Disability index score of 20% or less for the LEFS to assist with reaching prior level of function. []? Progressing: []? Met: [x]? Not Met: []? Adjusted  2. Patient will demonstrate increased AROM to full hip IR/ER to allow for proper joint functioning as indicated by patients Functional Deficits. []? Progressing: []? Met: [x]? Not Met: []? Adjusted  3. Patient will demonstrate an increase in Strength to good proximal hip strength and control, within 5lb HHD in LE to allow for proper functional mobility as indicated by patients Functional Deficits. []? Progressing: []? Met: [x]? Not Met: []? Adjusted  4. Patient will return to functional activities including position transfers and ambulation without increased symptoms or restriction. []? Progressing: []? Met: [x]? Not Met: []? Adjusted  5. Pt will return to ballroom dancing without increased sx or restriction. []? Progressing: []? Met: [x]? Not Met: []? Adjusted        ASSESSMENT:  Improved endurance today and functional glute facilitation. Step ups handled well without pain, good eccentric control and hip extension. Mild tenderness along distal IT band reported today improved w/ IASTM. Decreased to 1x/week for cost control. Treatment/Activity Tolerance:  [x] Patient tolerated treatment well [] Patient limited by fatique  [] Patient limited by pain  [] Patient limited by other medical complications  [] Other:     Overall Progression Towards Functional goals/ Treatment Progress Update:  [] Patient is progressing as expected towards functional goals listed.     [] Progression is slowed due to complexities/Impairments listed. [] Progression has been slowed due to co-morbidities. [x] Plan just implemented, too soon to assess goals progression <30days   [] Goals require adjustment due to lack of progress  [] Patient is not progressing as expected and requires additional follow up with physician  [] Other    Prognosis for POC: [x] Good [] Fair  [] Poor    Patient requires continued skilled intervention: [x] Yes  [] No        PLAN: Continue proximal hip ROM and strengthening as tolerated  [x] Continue per plan of care [] Alter current plan (see comments)  [] Plan of care initiated [] Hold pending MD visit [] Discharge    Electronically signed by: Jennifer Villanueva, PT    Note: If patient does not return for scheduled/recommended follow up visits, this note will serve as a discharge from care along with the most recent update on progress.

## 2020-07-15 ENCOUNTER — APPOINTMENT (OUTPATIENT)
Dept: PHYSICAL THERAPY | Age: 75
End: 2020-07-15
Payer: MEDICARE

## 2020-07-22 ENCOUNTER — HOSPITAL ENCOUNTER (OUTPATIENT)
Dept: PHYSICAL THERAPY | Age: 75
Setting detail: THERAPIES SERIES
Discharge: HOME | End: 2020-07-22
Payer: MEDICARE

## 2020-07-22 PROCEDURE — 97140 MANUAL THERAPY 1/> REGIONS: CPT

## 2020-07-22 PROCEDURE — 97110 THERAPEUTIC EXERCISES: CPT

## 2020-07-22 NOTE — FLOWSHEET NOTE
[place] [address] [facility], [place] [address]  Phone: [phone] Fax: [phone]    Physical Therapy Treatment Note/ Progress Report:     Date:  2020    Patient Name:  Cynthia Rush    :  [birth year]  MRN: [id number]  Restrictions/Precautions:    Medical/Treatment Diagnosis Information:  · Diagnosis: M70.61, M70.62 (ICD-10-CM) - Greater trochanteric bursitis of both hips  · Treatment Diagnosis: M25.552, R26.2, S00.6  Insurance/Certification information:  PT Insurance Information: PT BENEFITS 2020 FACILITY/ ANTHEM MEDIBLUE/ EFFECTIVE 18/ ACTIVE/ DED 0/ COPAY 35/ PAYS 100%/ OOP 6000 .04/ NO VISI TLIMIT MED NEC/ NO SEPERATE TELEHEALTH BENEFITS/ Ghislaine Lin REQ/ MONIQUE REF# [id number]/ 20 PAG  Physician Information:  Referring Practitioner: Daphane Rinne. Ana De Leon MD  Plan of care signed (Y/N):     Date of Patient follow up with Physician: 1 month     Progress Report: []  Yes  [x]  No     Date Range for reporting period:  Beginnin2020  Ending:      Progress report due (10 Rx/or 30 days whichever is less):      Recertification due (POC duration/ or 90 days whichever is less): 2020     Visit # Insurance Allowable Auth Needed   5 MN; 7 visits - [x]Yes   []No     Latex Allergy:  [x]NO      []YES  Preferred Language for Healthcare:   [x]English       []other:  Functional Scale: LEFS: 80 = 68% disability Date assessed:2020    Pain level: 0/10     SUBJECTIVE:  Pt states her hip is feeling good. Feels like she continues to get stronger every day. Is having some mild R thoracic pain and L hip crest pain which she attributes to not being as active as normal and things getting tight and stiff.      OBJECTIVE: See eval   Observation:    Test measurements:      RESTRICTIONS/PRECAUTIONS: L ITB tenotomy w/ bursectomy DOS: 2020    Exercises/Interventions:     Therapeutic Ex (33294)   Min: 30 Sets/sec Reps Notes/CUES   Retro Stepper/BIKE 7'     Alter G      BFR      Sportcord March      3 way SLR 2 10    SAQ 2 10    Clam ABD 2 10    Hip Ext /table 2 10 Cues    BOSU fwd/side lunge      BOSU squat      Leg Press Iso/Con/Ecc 0- 90 3x10    Cybex HS curl      TKE      Glute side walks orange 2 laps    RDL      Slide Lunge      Slide HS eccentrics      Step ups/ecc step down 4'' 15 Fwd/lat   Swissball wall rolls- in SLS- hip drive      Quad hip ext/wall-ball rolls      Supine hip flexion AAROM 2 10    SC push 1 10    Manual Intervention (10288)  Min: 10      Hip belt mobs 5  Distraction, inferior   Tib/Fem Mobs      Patella Mobs      Ankle mobs      IASTM 5  Distal IT band         NMR re-education (28300)  Min:   CUES NEEDED   Gabonese/Biofeedback 10/10      BFR      G. Med activation 2 10    Hip Ext full ROM/ G. Activation      Bosu Bal and Prop- G Med      Single leg stance/Balance/Prop      Bosu Retro G. Med act      Prone Hip froggers- sliders/elevated            Therapeutic Activity (48449)  Min:      Ladders      Plyos      Dynamic Balance                            Therapeutic Exercise and NMR EXR  [x] (12563) Provided verbal/tactile cueing for activities related to strengthening, flexibility, endurance, ROM for improvements in LE, proximal hip, and core control with self care, mobility, lifting, ambulation. [x] (03874) Provided verbal/tactile cueing for activities related to improving balance, coordination, kinesthetic sense, posture, motor skill, proprioception  to assist with LE, proximal hip, and core control in self care, mobility, lifting, ambulation and eccentric single leg control. NMR and Therapeutic Activities:    [x] (08682 or 03953) Provided verbal/tactile cueing for activities related to improving balance, coordination, kinesthetic sense, posture, motor skill, proprioception and motor activation to allow for proper function of core, proximal hip and LE with self care and ADLs and functional mobility. [] (66795) Gait Re-education- Provided training and instruction to the patient for proper LE, core and proximal hip recruitment and positioning and eccentric body weight control with ambulation re-education including up and down stairs     Home Exercise Program:    [x] (77265) Reviewed/Progressed HEP activities related to strengthening, flexibility, endurance, ROM of core, proximal hip and LE for functional self-care, mobility, lifting and ambulation/stair navigation   [] (28810)Reviewed/Progressed HEP activities related to improving balance, coordination, kinesthetic sense, posture, motor skill, proprioception of core, proximal hip and LE for self care, mobility, lifting, and ambulation/stair navigation      Manual Treatments:  PROM / STM / Oscillations-Mobs:  G-I, II, III, IV (PA's, Inf., Post.)  [x] (14540) Provided manual therapy to mobilize LE, proximal hip and/or LS spine soft tissue/joints for the purpose of modulating pain, promoting relaxation,  increasing ROM, reducing/eliminating soft tissue swelling/inflammation/restriction, improving soft tissue extensibility and allowing for proper ROM for normal function with self care, mobility, lifting and ambulation. Modalities:     [] GAME READY (VASO)- for significant edema, swelling, pain control. Charges:  Timed Code Treatment Minutes: 45   Total Treatment Minutes: 45      [] EVAL (LOW) 75596 (typically 20 minutes face-to-face)  [] EVAL (MOD) 09089 (typically 30 minutes face-to-face)  [] EVAL (HIGH) 76892 (typically 45 minutes face-to-face)  [] RE-EVAL     [x] YW(54397) x 2    [] DRY NEEDLE 1 OR 2 MUSCLES  [] NMR (41412) x     [] DRY NEEDLE 3+ MUSCLES  [x] Manual (07854) x 1      [] TA (90686) x     [] Mech Traction (07429)  [] ES(attended) (27620)     [] ES (un) (74849):   [] VASO (62620)  [] Other:        GOALS:  Patient stated goal: \"get back to ballroom dancing\"  []? Progressing: []? Met: [x]? Not Met: []?  Adjusted     Therapist goals for Patient: towards functional goals listed. [] Progression is slowed due to complexities/Impairments listed. [] Progression has been slowed due to co-morbidities. [x] Plan just implemented, too soon to assess goals progression <30days   [] Goals require adjustment due to lack of progress  [] Patient is not progressing as expected and requires additional follow up with physician  [] Other    Prognosis for POC: [x] Good [] Fair  [] Poor    Patient requires continued skilled intervention: [x] Yes  [] No        PLAN: Continue proximal hip ROM and strengthening as tolerated  [x] Continue per plan of care [] Alter current plan (see comments)  [] Plan of care initiated [] Hold pending MD visit [] Discharge    Electronically signed by: Nimco Montiel PT    Note: If patient does not return for scheduled/recommended follow up visits, this note will serve as a discharge from care along with the most recent update on progress.

## 2020-07-24 ENCOUNTER — OFFICE VISIT (OUTPATIENT)
Dept: ORTHOPEDIC SURGERY | Age: 75
End: 2020-07-24

## 2020-07-24 VITALS — TEMPERATURE: 98.6 F | WEIGHT: 139 LBS | BODY MASS INDEX: 26.26 KG/M2

## 2020-07-24 PROCEDURE — 99024 POSTOP FOLLOW-UP VISIT: CPT | Performed by: ORTHOPAEDIC SURGERY

## 2020-07-24 NOTE — PROGRESS NOTES
Returns 1 month status post left IT band tenotomy and bursectomy. She is doing extremely well. She says use her walker for 3 days and took only one pain pill postop and she is feeling back to normal at this time. ROS: Pertinent items are noted in HPI. No notes on file    Past Medical History:  No date: Anxiety  No date: GERD (gastroesophageal reflux disease)  No date: H/O breast biopsy  No date: Migraines     Past Surgical History:  No date:  SECTION  No date: CHOLECYSTECTOMY  2020: ILIOTIBIAL BAND SURGERY; Left      Comment:  LEFT OPEN ILIOTIBIAL BAND TENOTOMY AND BURSECTOMY                performed by Yajaira Rinaldi MD at [address] [facility]  No date: KNEE SURGERY; Bilateral      Comment:  ACL repair  No date: KNEE SURGERY; Left      Comment:  TKR    History reviewed. No pertinent family history.       Social History    Socioeconomic History      Marital status:       Spouse name: None      Number of children: None      Years of education: None      Highest education level: None    Occupational History      None    Social Needs      Financial resource strain: None      Food insecurity        Worry: None        Inability: None      Transportation needs        Medical: None        Non-medical: None    Tobacco Use      Smoking status: Never Smoker      Smokeless tobacco: Never Used    Substance and Sexual Activity      Alcohol use: Yes        Comment: social       Drug use: No      Sexual activity: Yes        Partners: Male    Lifestyle      Physical activity        Days per week: None        Minutes per session: None      Stress: None    Relationships      Social connections        Talks on phone: None        Gets together: None        Attends [employer] service: None        Active member of club or organization: None        Attends meetings of clubs or organizations: None        Relationship status: None      Intimate partner violence        Fear of current or ex partner: None Emotionally abused: None        Physically abused: None        Forced sexual activity: None    Other Topics      Concerns:        None    Social History Narrative      None      Current Outpatient Medications:  pantoprazole (PROTONIX) 40 MG tablet, TAKE 1 TABLET BY MOUTH EVERY DAY, Disp: , Rfl: 2  oxybutynin (DITROPAN-XL) 5 MG CR tablet, daily , Disp: , Rfl:   ALPRAZolam (XANAX) 0.25 MG tablet, Take 0.25 mg by mouth 5 times daily. , Disp: , Rfl:   FLUoxetine (PROZAC) 20 MG capsule, Take 40 mg by mouth 2 times daily , Disp: , Rfl:   Probiotic Product (PROBIOTIC DAILY PO), Take 1 tablet by mouth daily. , Disp: , Rfl:   FIBER PO, Take 5 tablets by mouth daily Pt has diverticulitis, Disp: , Rfl:    Multiple Vitamins-Minerals (MULTIVITAMIN PO), Take 1 tablet by mouth daily. , Disp: , Rfl:   gabapentin (NEURONTIN) 100 MG capsule, Take 1,000 mg by mouth daily. 100 mg 4xs daily and 600mg at night, Disp: , Rfl:   meloxicam (MOBIC) 15 MG tablet, Take 15 mg by mouth daily. , Disp: , Rfl:     No current facility-administered medications for this visit. -- Codeine -- Nausea And Vomiting   -- Meperidine -- Nausea And Vomiting   -- Oxycodone-Acetaminophen -- Nausea And Vomiting   -- Percocet [Oxycodone-Acetaminophen] -- Rash   -- Propoxyphene -- Nausea And Vomiting    VITAL SIGNS:  Temp 98.6 °F (37 °C)   Wt 139 lb (63 kg)   BMI 26.26 kg/m²   On examination today her incision is nicely healed. Is clean and soft with no erythema or induration. There is no tenderness. She has full hip range of motion with no pain. She has excellent resisted flexion and abduction strength. Her calf is soft she has negative Homans. Impression doing very well 1 month status post left IT band tenotomy and bursectomy. Plan: She may be discharged from care.

## 2020-07-27 ENCOUNTER — HOSPITAL ENCOUNTER (OUTPATIENT)
Dept: PHYSICAL THERAPY | Age: 75
Setting detail: THERAPIES SERIES
Discharge: HOME | End: 2020-07-27
Payer: MEDICARE

## 2020-07-27 PROCEDURE — 97140 MANUAL THERAPY 1/> REGIONS: CPT

## 2020-07-27 PROCEDURE — 97110 THERAPEUTIC EXERCISES: CPT

## 2020-07-27 NOTE — FLOWSHEET NOTE
[place] [address] [facility]  Phone: [phone] Fax: [phone]    Physical Therapy Treatment Note/ Progress Report:     Date:  2020    Patient Name:  Charity Edwards    :    MRN: [id number]  Restrictions/Precautions:    Medical/Treatment Diagnosis Information:  · Diagnosis: M70.61, M70.62 (ICD-10-CM) - Greater trochanteric bursitis of both hips  · Treatment Diagnosis: M25.552, R26.2, B65.8  Insurance/Certification information:  PT Insurance Information: PT BENEFITS 2020 FACILITY/ ANTHEM MEDIBLUE/ EFFECTIVE 18/ ACTIVE/ DED 0/ COPAY 35/ PAYS 100%/ OOP 6000 .04/ NO VISI TLIMIT MED NEC/ NO SEPERATE TELEHEALTH BENEFITS/ Mliss Raafshan WITT/ MONIQUE REF# [id number]/ 20 PAG  Physician Information:  Referring Practitioner: Yun Madison. Shin Paris MD  Plan of care signed (Y/N):     Date of Patient follow up with Physician: 1 month     Progress Report: [x]  Yes  []  No     Date Range for reporting period:  Beginnin2020  Ending:      Progress report due (10 Rx/or 30 days whichever is less):      Recertification due (POC duration/ or 90 days whichever is less): 2020     Visit # Insurance Allowable Auth Needed   6 MN; 9 visits - [x]Yes   []No     Latex Allergy:  [x]NO      []YES  Preferred Language for Healthcare:   [x]English       []other:  Functional Scale: LEFS: 46/80 = 42% disability Date assessed:2020    Pain level: 0/10     SUBJECTIVE:  Pt states her hip is feeling good, feels like she made a lot of progress this last week. States she can go up and down stairs reciprocally without increased pain.      OBJECTIVE: See eval   Observation:    Test measurements:      RESTRICTIONS/PRECAUTIONS: L ITB tenotomy w/ bursectomy DOS: 2020    Exercises/Interventions:     Therapeutic Ex (83601)   Min: 30 Sets/sec Reps Notes/CUES   Retro Stepper/BIKE 7'     Alter G      BFR      Sportcord March      3 way SLR 2 10    SAQ 2 10    Clam ABD 2 10    Hip Ext /table 2 10 Cues    BOSU fwd/side lunge      BOSU squat      Leg Press Iso/Con/Ecc 0- 90 3x10    Cybex HS curl      TKE      Glute side walks orange 2 laps    RDL      Slide Lunge      Slide HS eccentrics      Step ups/ecc step down 4'' 15 Fwd/lat   Swissball wall rolls- in SLS- hip drive      Quad hip ext/wall-ball rolls      Supine hip flexion AAROM 2 10    SC march 1 10 Fwd/bwd; yellow   SC push 1 10    Manual Intervention (47903)  Min: 10      Hip belt mobs 5  Distraction, inferior   Tib/Fem Mobs      Patella Mobs      Ankle mobs      IASTM 5  Distal IT band         NMR re-education (37493)  Min:   CUES NEEDED   Turkish/Biofeedback 10/10      BFR      G. Med activation 2 10    Hip Ext full ROM/ G. Activation      Bosu Bal and Prop- G Med      Single leg stance/Balance/Prop      Bosu Retro G. Med act      Prone Hip froggers- sliders/elevated            Therapeutic Activity (25421)  Min:      Ladders      Plyos      Dynamic Balance                            Therapeutic Exercise and NMR EXR  [x] (76387) Provided verbal/tactile cueing for activities related to strengthening, flexibility, endurance, ROM for improvements in LE, proximal hip, and core control with self care, mobility, lifting, ambulation. [x] (19889) Provided verbal/tactile cueing for activities related to improving balance, coordination, kinesthetic sense, posture, motor skill, proprioception  to assist with LE, proximal hip, and core control in self care, mobility, lifting, ambulation and eccentric single leg control.      NMR and Therapeutic Activities:    [x] (10513 or 94998) Provided verbal/tactile cueing for activities related to improving balance, coordination, kinesthetic sense, posture, motor skill, proprioception and motor activation to allow for proper function of core, proximal hip and LE with self care and ADLs and functional mobility.   [] (77476) Gait Re-education- Provided training and instruction to the patient for proper LE, core and proximal hip recruitment and positioning and eccentric body weight control with ambulation re-education including up and down stairs     Home Exercise Program:    [x] (38004) Reviewed/Progressed HEP activities related to strengthening, flexibility, endurance, ROM of core, proximal hip and LE for functional self-care, mobility, lifting and ambulation/stair navigation   [] (31906)Reviewed/Progressed HEP activities related to improving balance, coordination, kinesthetic sense, posture, motor skill, proprioception of core, proximal hip and LE for self care, mobility, lifting, and ambulation/stair navigation      Manual Treatments:  PROM / STM / Oscillations-Mobs:  G-I, II, III, IV (PA's, Inf., Post.)  [x] (73268) Provided manual therapy to mobilize LE, proximal hip and/or LS spine soft tissue/joints for the purpose of modulating pain, promoting relaxation,  increasing ROM, reducing/eliminating soft tissue swelling/inflammation/restriction, improving soft tissue extensibility and allowing for proper ROM for normal function with self care, mobility, lifting and ambulation. Modalities:     [] GAME READY (VASO)- for significant edema, swelling, pain control. Charges:  Timed Code Treatment Minutes: 45   Total Treatment Minutes: 45      [] EVAL (LOW) 22620 (typically 20 minutes face-to-face)  [] EVAL (MOD) 87740 (typically 30 minutes face-to-face)  [] EVAL (HIGH) 86513 (typically 45 minutes face-to-face)  [] RE-EVAL     [x] WZ(19168) x 2    [] DRY NEEDLE 1 OR 2 MUSCLES  [] NMR (26804) x     [] DRY NEEDLE 3+ MUSCLES  [x] Manual (36566) x 1      [] TA (86051) x     [] Mech Traction (51480)  [] ES(attended) (56940)     [] ES (un) (10842):   [] VASO (64574)  [] Other:        GOALS:  Patient stated goal: \"get back to ballroom dancing\"  []? Progressing: []? Met: [x]? Not Met: []? Adjusted     Therapist goals for Patient:   Short Term Goals:  To be achieved in: 2 weeks  1. Independent in HEP and progression per patient tolerance, in order to prevent re-injury. []? Progressing: []? Met: [x]? Not Met: []? Adjusted  2. Patient will have a decrease in pain to facilitate improvement in movement, function, and ADLs as indicated by Functional Deficits. []? Progressing: []? Met: [x]? Not Met: []? Adjusted     Long Term Goals: To be achieved in: 8 weeks  1. Disability index score of 20% or less for the LEFS to assist with reaching prior level of function. [x]? Progressing: []? Met: []? Not Met: []? Adjusted  2. Patient will demonstrate increased AROM to full hip IR/ER to allow for proper joint functioning as indicated by patients Functional Deficits. []? Progressing: [x]? Met: []? Not Met: []? Adjusted  3. Patient will demonstrate an increase in Strength to good proximal hip strength and control, within 5lb HHD in LE to allow for proper functional mobility as indicated by patients Functional Deficits. []? Progressing: []? Met: [x]? Not Met: []? Adjusted  4. Patient will return to functional activities including position transfers and ambulation without increased symptoms or restriction. []? Progressing: [x]? Met: []? Not Met: []? Adjusted  5. Pt will return to ballroom dancing without increased sx or restriction. []? Progressing: []? Met: [x]? Not Met: []? Adjusted        ASSESSMENT:  Continues w/ good progression of balance and strength and endurance. Increased fatigue today. Treatment/Activity Tolerance:  [x] Patient tolerated treatment well [x] Patient limited by fatique  [] Patient limited by pain  [] Patient limited by other medical complications  [] Other:     Overall Progression Towards Functional goals/ Treatment Progress Update:  [] Patient is progressing as expected towards functional goals listed. [] Progression is slowed due to complexities/Impairments listed. [] Progression has been slowed due to co-morbidities.   [x] Plan just implemented, too soon to assess goals progression <30days   [] Goals require adjustment due to lack of progress  [] Patient is not progressing as expected and requires additional follow up with physician  [] Other    Prognosis for POC: [x] Good [] Fair  [] Poor    Patient requires continued skilled intervention: [x] Yes  [] No        PLAN: Continue proximal hip ROM and strengthening as tolerated  [x] Continue per plan of care [] Alter current plan (see comments)  [] Plan of care initiated [] Hold pending MD visit [] Discharge    Electronically signed by: Harman Hightower PT    Note: If patient does not return for scheduled/recommended follow up visits, this note will serve as a discharge from care along with the most recent update on progress.

## 2020-08-03 ENCOUNTER — HOSPITAL ENCOUNTER (OUTPATIENT)
Dept: PHYSICAL THERAPY | Age: 75
Setting detail: THERAPIES SERIES
Discharge: HOME | End: 2020-08-03
Payer: MEDICARE

## 2020-08-03 PROCEDURE — 97110 THERAPEUTIC EXERCISES: CPT

## 2020-08-03 PROCEDURE — 97140 MANUAL THERAPY 1/> REGIONS: CPT

## 2020-08-03 NOTE — FLOWSHEET NOTE
[place] [address] [facility]  Phone: [phone] Fax: [phone]    Physical Therapy Treatment Note/ Progress Report:     Date:  8/3/2020    Patient Name:  Brian Frias    :    MRN: [id number]  Restrictions/Precautions:    Medical/Treatment Diagnosis Information:  · Diagnosis: M70.61, M70.62 (ICD-10-CM) - Greater trochanteric bursitis of both hips  · Treatment Diagnosis: M25.552, R26.2, H43.9  Insurance/Certification information:  PT Insurance Information: PT BENEFITS 2020 FACILITY/ ANTHEM MEDIBLUE/ EFFECTIVE 18/ ACTIVE/ DED 0/ COPAY 35/ PAYS 100%/ OOP 6000 .04/ NO VISI TLIMIT MED NEC/ NO SEPERATE TELEHEALTH BENEFITS/ Derrick Rodriguez REQ/ MONIQUE REF# [id number] 20 PAG  Physician Information:  Referring Practitioner: Brayan Bergeron. Zhang Smith MD  Plan of care signed (Y/N):     Date of Patient follow up with Physician: 1 month     Progress Report: [x]  Yes  []  No     Date Range for reporting period:  Beginnin2020  Ending:      Progress report due (10 Rx/or 30 days whichever is less):      Recertification due (POC duration/ or 90 days whichever is less): 2020     Visit # Insurance Allowable Auth Needed   7 MN; 9 visits - [x]Yes   []No     Latex Allergy:  [x]NO      []YES  Preferred Language for Healthcare:   [x]English       []other:  Functional Scale: LEFS: 46/80 = 42% disability Date assessed:2020    Pain level: 0/10     SUBJECTIVE:  Patient reports that her hip is a little sore today from walking/ standing at [employer]. Feels that she did more than she had done previously. States that she is tired today from being up in the night, unrelated to hip.      OBJECTIVE: See eval   Observation:    Test measurements:      RESTRICTIONS/PRECAUTIONS: L ITB tenotomy w/ bursectomy DOS: 2020    Exercises/Interventions:     Therapeutic Ex (12637)   Min: 30 Sets/sec Reps Notes/CUES   Retro functional mobility.   [] (61082) Gait Re-education- Provided training and instruction to the patient for proper LE, core and proximal hip recruitment and positioning and eccentric body weight control with ambulation re-education including up and down stairs     Home Exercise Program:    [x] (86045) Reviewed/Progressed HEP activities related to strengthening, flexibility, endurance, ROM of core, proximal hip and LE for functional self-care, mobility, lifting and ambulation/stair navigation   [] (01490)Reviewed/Progressed HEP activities related to improving balance, coordination, kinesthetic sense, posture, motor skill, proprioception of core, proximal hip and LE for self care, mobility, lifting, and ambulation/stair navigation      Manual Treatments:  PROM / STM / Oscillations-Mobs:  G-I, II, III, IV (PA's, Inf., Post.)  [x] (83442) Provided manual therapy to mobilize LE, proximal hip and/or LS spine soft tissue/joints for the purpose of modulating pain, promoting relaxation,  increasing ROM, reducing/eliminating soft tissue swelling/inflammation/restriction, improving soft tissue extensibility and allowing for proper ROM for normal function with self care, mobility, lifting and ambulation. Modalities:     [] GAME READY (VASO)- for significant edema, swelling, pain control. Charges:  Timed Code Treatment Minutes: 45   Total Treatment Minutes: 45      [] EVAL (LOW) 94141 (typically 20 minutes face-to-face)  [] EVAL (MOD) 67799 (typically 30 minutes face-to-face)  [] EVAL (HIGH) 74414 (typically 45 minutes face-to-face)  [] RE-EVAL     [x] AE(59721) x 2    [] DRY NEEDLE 1 OR 2 MUSCLES  [] NMR (48922) x     [] DRY NEEDLE 3+ MUSCLES  [x] Manual (13478) x 1      [] TA (39340) x     [] Mech Traction (44653)  [] ES(attended) (50642)     [] ES (un) (71480):   [] VASO (51320)  [] Other:        GOALS:  Patient stated goal: \"get back to ballroom dancing\"  []? Progressing: []? Met: [x]?  Not Met: []? Adjusted     Therapist goals for Patient:   Short Term Goals: To be achieved in: 2 weeks  1. Independent in HEP and progression per patient tolerance, in order to prevent re-injury. []? Progressing: []? Met: [x]? Not Met: []? Adjusted  2. Patient will have a decrease in pain to facilitate improvement in movement, function, and ADLs as indicated by Functional Deficits. []? Progressing: []? Met: [x]? Not Met: []? Adjusted     Long Term Goals: To be achieved in: 8 weeks  1. Disability index score of 20% or less for the LEFS to assist with reaching prior level of function. [x]? Progressing: []? Met: []? Not Met: []? Adjusted  2. Patient will demonstrate increased AROM to full hip IR/ER to allow for proper joint functioning as indicated by patients Functional Deficits. []? Progressing: [x]? Met: []? Not Met: []? Adjusted  3. Patient will demonstrate an increase in Strength to good proximal hip strength and control, within 5lb HHD in LE to allow for proper functional mobility as indicated by patients Functional Deficits. []? Progressing: []? Met: [x]? Not Met: []? Adjusted  4. Patient will return to functional activities including position transfers and ambulation without increased symptoms or restriction. []? Progressing: [x]? Met: []? Not Met: []? Adjusted  5. Pt will return to ballroom dancing without increased sx or restriction. []? Progressing: []? Met: [x]? Not Met: []? Adjusted        ASSESSMENT: Patient tolerated treatment well. Difficulty with proprioception in sport cord today. Appropriately fatigued at conclusion. Treatment/Activity Tolerance:  [x] Patient tolerated treatment well [x] Patient limited by fatique  [] Patient limited by pain  [] Patient limited by other medical complications  [] Other:     Overall Progression Towards Functional goals/ Treatment Progress Update:  [] Patient is progressing as expected towards functional goals listed.     [] Progression is slowed due to complexities/Impairments listed. [] Progression has been slowed due to co-morbidities. [x] Plan just implemented, too soon to assess goals progression <30days   [] Goals require adjustment due to lack of progress  [] Patient is not progressing as expected and requires additional follow up with physician  [] Other    Prognosis for POC: [x] Good [] Fair  [] Poor    Patient requires continued skilled intervention: [x] Yes  [] No        PLAN: Continue proximal hip ROM and strengthening as tolerated  [x] Continue per plan of care [] Alter current plan (see comments)  [] Plan of care initiated [] Hold pending MD visit [] Discharge    Electronically signed by: Anthony Walker PTA    Note: If patient does not return for scheduled/recommended follow up visits, this note will serve as a discharge from care along with the most recent update on progress.

## 2020-08-10 ENCOUNTER — HOSPITAL ENCOUNTER (OUTPATIENT)
Dept: PHYSICAL THERAPY | Age: 75
Setting detail: THERAPIES SERIES
Discharge: HOME | End: 2020-08-10
Payer: MEDICARE

## 2020-08-10 PROCEDURE — 97530 THERAPEUTIC ACTIVITIES: CPT

## 2020-08-10 PROCEDURE — 97110 THERAPEUTIC EXERCISES: CPT

## 2020-08-17 ENCOUNTER — APPOINTMENT (OUTPATIENT)
Dept: PHYSICAL THERAPY | Age: 75
End: 2020-08-17
Payer: MEDICARE
